# Patient Record
Sex: MALE | Race: WHITE | NOT HISPANIC OR LATINO | Employment: FULL TIME | URBAN - METROPOLITAN AREA
[De-identification: names, ages, dates, MRNs, and addresses within clinical notes are randomized per-mention and may not be internally consistent; named-entity substitution may affect disease eponyms.]

---

## 2017-02-03 ENCOUNTER — ALLSCRIPTS OFFICE VISIT (OUTPATIENT)
Dept: OTHER | Facility: OTHER | Age: 27
End: 2017-02-03

## 2017-02-21 ENCOUNTER — OFFICE VISIT (OUTPATIENT)
Dept: URGENT CARE | Facility: MEDICAL CENTER | Age: 27
End: 2017-02-21
Payer: COMMERCIAL

## 2017-02-21 DIAGNOSIS — R68.89 OTHER GENERAL SYMPTOMS AND SIGNS: ICD-10-CM

## 2017-02-21 PROCEDURE — 99213 OFFICE O/P EST LOW 20 MIN: CPT

## 2017-02-22 ENCOUNTER — APPOINTMENT (OUTPATIENT)
Dept: LAB | Facility: HOSPITAL | Age: 27
End: 2017-02-22
Payer: COMMERCIAL

## 2017-02-22 DIAGNOSIS — R68.89 OTHER GENERAL SYMPTOMS AND SIGNS: ICD-10-CM

## 2017-02-22 LAB
FLUAV AG SPEC QL IA: NEGATIVE
FLUAV AG SPEC QL: DETECTED
FLUBV AG SPEC QL IA: NEGATIVE
FLUBV AG SPEC QL: ABNORMAL
RSV B RNA SPEC QL NAA+PROBE: ABNORMAL

## 2017-02-22 PROCEDURE — 87798 DETECT AGENT NOS DNA AMP: CPT

## 2017-02-22 PROCEDURE — 87400 INFLUENZA A/B EACH AG IA: CPT

## 2017-05-08 ENCOUNTER — ALLSCRIPTS OFFICE VISIT (OUTPATIENT)
Dept: OTHER | Facility: OTHER | Age: 27
End: 2017-05-08

## 2017-06-19 ENCOUNTER — TRANSCRIBE ORDERS (OUTPATIENT)
Dept: SLEEP CENTER | Facility: CLINIC | Age: 27
End: 2017-06-19

## 2017-06-19 DIAGNOSIS — G47.33 OSA (OBSTRUCTIVE SLEEP APNEA): Primary | ICD-10-CM

## 2017-11-09 NOTE — PROGRESS NOTES
Assessment    1  Flu-like symptoms (780 99) (X77 24)    Plan   (1) RAPID INFLUENZA SCREEN with reflex to PCR; Status:Resulted - Requires Verification;   Done: [id number] 12:00AM [id number];RWUSOCE; Stat;   For:Flu-like symptoms; Ordered By:Guadagnino, Anastacio Koyanagi; Discussion/Summary  Discussion Summary:   Increased fluid intake  Tylenol motion as directed for fever  the bed,  Medication Side Effects Reviewed: Possible side effects of new medications were reviewed with the patient/guardian today  Understands and agrees with treatment plan: The treatment plan was reviewed with the patient/guardian  The patient/guardian understands and agrees with the treatment plan   Counseling Documentation With Imm: The patient was counseled regarding diagnostic results,-- instructions for management,-- risk factor reductions,-- prognosis,-- patient and family education,-- impressions,-- risks and benefits of treatment options,-- importance of compliance with treatment  Follow Up Instructions: Follow Up with your Primary Care Provider in 1-2 days  If your symptoms worsen, go to the nearest [facility] Emergency Department  Chief Complaint    1  Dizziness   2  Fever  Chief Complaint Free Text Note Form: Presents with body aches, fever, dizziness, nasal congestion starting yesterday  Extreme fatigue and malaise  Taking DaQuil and NyQuil for symptoms  Last taken this am       History of Present Illness  Hospital Based Practices Required Assessment:  Pain Assessment  the patient states they have pain  The pain is located in the General  The patient describes the pain as aching  (on a scale of 0 to 10, the patient rates the pain at 5 )  Abuse And Domestic Violence Screen   Domestic violence screen not done today  Reason DV Screen not done: Not alone   Depression And Suicide Screen  Suicide screen not done today  -- Reason suicide screen not done: Not alone  Prefered Language is  [place]  Primary Language is  English  Readiness To Learn: Receptive  Barriers To Learning: none  Preferred Learning: verbal   Influenza, Adult (Brief): The patient is being seen for an initial evaluation of influenza  Symptoms:  fever,-- chills,-- weakness,-- sore throat-- and-- cough, but-- no malaise  Associated symptoms:  dizziness, but-- no wheezing,-- no chest pain,-- no neck stiffness,-- no lightheadedness,-- no dry mouth,-- no decreased urination,-- no nausea,-- no vomiting-- and-- no diarrhea  Review of Systems  Focused-Male:  Constitutional: no fever or chills, feels well, no tiredness, no recent weight loss or weight gain  ENT: no complaints of earache, no loss of hearing, no nosebleeds or nasal discharge, no sore throat or hoarseness-- and-- as noted in HPI  Cardiovascular: no complaints of slow or fast heart rate, no chest pain, no palpitations, no leg claudication or lower extremity edema  Respiratory: no complaints of shortness of breath, no wheezing or cough, no dyspnea on exertion, no orthopnea or PND  Gastrointestinal: no complaints of abdominal pain, no constipation, no nausea or vomiting, no diarrhea or bloody stools  Genitourinary: no complaints of dysuria or incontinence, no hesitancy, no nocturia, no genital lesion, no inadequacy of penile erection  Musculoskeletal: no complaints of arthralgia, no myalgia, no joint swelling or stiffness, no limb pain or swelling  Integumentary: no complaints of skin rash or lesion, no itching or dry skin, no skin wounds  Neurological: no complaints of headache, no confusion, no numbness or tingling, no dizziness or fainting  Active Problems     1  Neck pain (723 1) (M54 2)   2  Pain in both wrists (719 43) (M25 531,M25 532)  Multiple sclerosis (340) (G35)       Past Medical History  1  History of migraine (V12 49) (Z86 69)  Active Problems And Past Medical History Reviewed: The active problems and past medical history were reviewed and updated today        Family History  Mother    1  Family history of hypothyroidism (V18 19) (Z83 49)  Father    2  Family history of kidney disease (V18 69) (Z84 1)   3  Family history of malignant neoplasm (V16 9) (Z80 9)  Maternal Grandfather    4  Family history of diabetes mellitus (V18 0) (Z83 3)  Family History Reviewed: The family history was reviewed and updated today  Social History     · Caffeine use (V49 89) (F15 90)   ·    · Never a smoker   · No drug use   · Social alcohol use (Z78 9)  Social History Reviewed: The social history was reviewed and updated today  Surgical History  Surgical History Reviewed: The surgical history was reviewed and updated today  Current Meds   1  Aubagio 14 MG Oral Tablet; take one tablet by mouth daily; Therapy: (Recorded:[id number]) to Recorded   2  Vitamin D 87915 UNIT CAPS; TAKE 1 CAPSULE WEEKLY; Therapy: (Recorded:21Ygv6675) to Recorded  Medication List Reviewed: The medication list was reviewed and updated today  Allergies    1  No Known Drug Allergies    Vitals  Signs   Recorded: 21Feb2017 07:38PM   Temperature: 99 7 F, Temporal  Heart Rate: 110  Pulse Quality: Normal  Respiration Quality: Normal  Respiration: 18  Systolic: 659, Sitting  Diastolic: 70, Sitting  O2 Saturation: 97, RA    Physical Exam   Constitutional  General appearance: No acute distress, well appearing and well nourished  Eyes  Conjunctiva and lids: No swelling, erythema, or discharge  Pupils and irises: Equal, round and reactive to light  Ears, Nose, Mouth, and Throat  External inspection of ears and nose: Normal    Otoscopic examination: Tympanic membrance translucent with normal light reflex  Canals patent without erythema  Nasal mucosa, septum, and turbinates: Abnormal  -- clear nasal mucosa discharge  Oropharynx: Abnormal  -- Mild erythema, +1 tonsils, she was spots  Pulmonary  Respiratory effort: No increased work of breathing or signs of respiratory distress     Auscultation of lungs: Clear to auscultation  Cardiovascular  Palpation of heart: Normal PMI, no thrills  Auscultation of heart: Normal rate and rhythm, normal S1 and S2, without murmurs  Examination of extremities for edema and/or varicosities: Normal    Abdomen  Abdomen: Non-tender, no masses  Liver and spleen: No hepatomegaly or splenomegaly  Lymphatic  Palpation of lymph nodes in neck: No lymphadenopathy  Musculoskeletal  Gait and station: Normal    Digits and nails: Normal without clubbing or cyanosis  Inspection/palpation of joints, bones, and muscles: Normal    Skin  Skin and subcutaneous tissue: Normal without rashes or lesions  -- cap refill less than 2 seconds  Neurologic  Cranial nerves: Cranial nerves 2-12 intact  Reflexes: 2+ and symmetric  Sensation: No sensory loss  Psychiatric  Orientation to person, place and time: Normal    Mood and affect: Normal        Message  Return to work or school:   Philip Sexton is under my professional care  He was seen in my office on 02/21/2017       Please excuse illness          Signatures   Electronically signed by : Cristy Williamson [facility]; Nov 7 2017 10:09PM EST                       (Author)    Electronically signed by : YUNG Funez ; Nov 8 2017  8:58AM EST                       (Co-author)

## 2018-01-13 VITALS
HEIGHT: 70 IN | WEIGHT: 125 LBS | SYSTOLIC BLOOD PRESSURE: 104 MMHG | BODY MASS INDEX: 17.9 KG/M2 | DIASTOLIC BLOOD PRESSURE: 62 MMHG | RESPIRATION RATE: 14 BRPM | HEART RATE: 86 BPM

## 2018-01-14 VITALS
RESPIRATION RATE: 12 BRPM | SYSTOLIC BLOOD PRESSURE: 108 MMHG | BODY MASS INDEX: 17.9 KG/M2 | OXYGEN SATURATION: 98 % | HEIGHT: 70 IN | DIASTOLIC BLOOD PRESSURE: 60 MMHG | WEIGHT: 125 LBS | HEART RATE: 68 BPM

## 2018-01-18 NOTE — MISCELLANEOUS
Message  Return to work or school:   Kendall Ma is under my professional care  He was seen in my office on 02/21/2017       Please excuse illness          Signatures   Electronically signed by : Eleazar Hernandez, [facility]; Nov 7 2017 10:09PM EST                       (Author)

## 2021-08-31 ENCOUNTER — TELEPHONE (OUTPATIENT)
Dept: NEUROLOGY | Facility: CLINIC | Age: 31
End: 2021-08-31

## 2021-08-31 NOTE — TELEPHONE ENCOUNTER
Best contact number for patient: [phone]    Emergency Contact name and number: [phone]    Referring provider and telephone number:    Primary Care Provider Name and if affiliated with [facility]:     Reason for Appointment/Dx: MS     Have you seen and followed up with a pediatric Neurologist for this disease in the past?      No (If yes ok to schedule with Dr Espinoza Grewal)    Neurology Location patient would like to be seen:    Order received? No                                                 Records Received? Yes    Have you ever seen another Neurologist?       Yes, Yes, Dr Liz Dawson Name:    ID/Policy #:    Secondary Insurance:    ID/Policy#: Workman's Comp/ Accident/ School  Information      Workman's Comp/Accident/School related?        No    If yes name of Insurance company:    Claim #:    Date of Injury:    Type of Injury:     Name and Telephone Number:    Notes:                   Appointment date: 01/28/2022

## 2022-01-27 ENCOUNTER — TELEPHONE (OUTPATIENT)
Dept: NEUROLOGY | Facility: CLINIC | Age: 32
End: 2022-01-27

## 2022-01-27 NOTE — TELEPHONE ENCOUNTER
Called to confirm and offer VV , spoke with patients  and he will set up patients My Chart for visit

## 2022-01-28 ENCOUNTER — TELEMEDICINE (OUTPATIENT)
Dept: NEUROLOGY | Facility: CLINIC | Age: 32
End: 2022-01-28
Payer: COMMERCIAL

## 2022-01-28 ENCOUNTER — TELEPHONE (OUTPATIENT)
Dept: NEUROLOGY | Facility: CLINIC | Age: 32
End: 2022-01-28

## 2022-01-28 DIAGNOSIS — R68.82 DECREASED LIBIDO: ICD-10-CM

## 2022-01-28 DIAGNOSIS — G35 MULTIPLE SCLEROSIS (HCC): Primary | ICD-10-CM

## 2022-01-28 PROCEDURE — 99204 OFFICE O/P NEW MOD 45 MIN: CPT | Performed by: PSYCHIATRY & NEUROLOGY

## 2022-01-28 RX ORDER — MULTIVIT-MIN/IRON FUM/FOLIC AC 7.5 MG-4
1 TABLET ORAL DAILY
COMMUNITY

## 2022-01-28 NOTE — PROGRESS NOTES
Virtual Regular Visit    Verification of patient location:    Patient is located in the following state in which I hold an active license [place]      Assessment/Plan:    Problem List Items Addressed This Visit        Nervous and Auditory    Multiple sclerosis ([address] ) - Primary     Pt here for neuro consultation  Pt wishes to re establish care for MS  Last seen in 2017  Pt notes he has been off imd meds since our last visit  Pt last on aubagio in dec 2018  Pt notes no recent infections  No recent hospitalizations  No recent steroids  No recent updated imaging  Exam stable  Last imaging from 2016  rec updated mri head and c spine  Updated labs including jcv testing  Referral to optho due to right eye pain and int blurriness of vision  Referral to urology due to decreased libidio  Follow up after studies to review best med options         Relevant Orders    MRI brain MS wo and w contrast    MRI cervical spine with and without contrast    Comprehensive metabolic panel    CBC and differential    Lyme Antibody Profile with reflex to WB    Sjogren's Antibodies    Vitamin D 25 hydroxy    Quantiferon TB Gold Plus    Ambulatory Referral to Urology    Ambulatory Referral to Ophthalmology       Other    Decreased libido     Not sure completely related to [address]  rec urology referral as well  No new bowel or bladder issues  Update imaging         Relevant Orders    Ambulatory Referral to Urology               Reason for visit is   Chief Complaint   Patient presents with    Virtual Regular Visit        Encounter provider Christos Miller MD    Provider located at [address] [place] [address] Drive  [address]  [phone]      Recent Visits  Date Type Provider Dept   01/27/22 Telephone Baron Kocher [facility] [place] [address] recent visits within past 7 days and meeting all other requirements  Today's Visits  Date Type Provider Dept   01/28/22 Telemedicine Kb Vincent Christie Caldera MD [facility] today's visits and meeting all other requirements  Future Appointments  No visits were found meeting these conditions  Showing future appointments within next 150 days and meeting all other requirements       The patient was identified by name and date of birth  Brookie Felty was informed that this is a telemedicine visit and that the visit is being conducted through [facility] and patient was informed this is a secure, HIPAA-complaint platform  He agrees to proceed     My office door was closed  No one else was in the room  He acknowledged consent and understanding of privacy and security of the video platform  The patient has agreed to participate and understands they can discontinue the visit at any time  Patient is aware this is a billable service  Subjective  Brookie Felty is a 32 y o  male with pmh of MS, JOSE who presents today to re establish MS care  Pt last seen on may 8 2017  Per last visit, "PMH of asthma, monitoring of thyroid disease by PCP, vertigo at age 16, who presents for MS follow up  He was initially seen in Sept 2016 for eval of newly diagnosed MS  Patient had symptoms starting in April 2016 with some issues with his speech  He notes he would stop in the middle of the sentence, or have slurring of his speech  PCP ordered MRI and labs  Patient notes first MRI was done without contrast, and he was brought back in for contrasted study  Patient notes he was told by PCP that he likely had MS and was sent to [address] [place] [address] neurology  He was seen in May 2016 by [facility] neurology and had additional studies including MRI c-spine and t-spine  He also had an EKG and was told this was possibly abnormal and referred to cardio   Cardiology would not schedule and appt until EKG was repeated because they thought there was artifact on the original test  No follow up EKG or cardio appt done as patient became frustrated that no one was ordering his studies or MS meds  Patient notes he was told he was going to start Tysabri, but no discussion of the med  When he researched the med on his own, he had concerns about taking the med as his first line therapy  He has never been treated with IV steroids to date  MRI brain 4/2016 no acute infarct  Multiple primarily sub centimeter FLAIR hyperintense lesions present within the subcortical WM, juxtacortical location and deep PV white matter  Additionally a single small lesion present within the splenium of the corpus callosum  At least one of the lesions has a somewhat Dawsonâs finger-like morphology  The totally number of lesions is approximately 15 with the largest at 12mm within the subcortical WM of the left parietal lobe  Although non-specific, given the number and location of the lesions, most concerning for demyelinating process  Patient went back with contrast and there were several areas of enhancement notes within the subcortical WM of the frontal lobes bilaterally, left parietal, and madeline temporal lobes  These are concerning for active demyelination  The total number of enhancing lesions is about 7 on this study  MRI t-spine done 6/3/16 no cord lesions  MRI c-spine 6/3/16 no clear evidence of focal cord demyelination, mild DDD, no spinal stenosis  Question of artifact on sagittal films  All studies reviewed with patient and discussed that lesions  by time and space would suggest MS  IMD options discussed at consult appointment and JCV testing ordered  Patient concerned about meds related to PML  rev results with pt including JCV negative with index of 0 17, done on Oct 17, 2016  Baseline CBC and hepatic function normal  Neg Lyme, Sjogrens  He and his spouse have researched some meds and are most interested in oral meds  He does not want Gilenya due to the abnormal EKG he had back in the Spring  pt has started [place] since our last visit  pt has been on med for 5 months  pt gloria med without difficulty  no rash  no gi issues  "  Pt here today after 5 years from last visit  Pt seen virtually today with his spouse  Overall pt notes he did not follow up due to timing and younger age  Pt notes no new sxs over the past 5 years  No recent infections  No recent hospitlizations  No fever or chills  No cough or sob  No cp  Pt does not think he had covid over the past 2 years but did have 2 days of fever in past    Pt notes no covid vaccination at this time  No urgi care visits  No recent iv steroids  Pt denies any new sxs  No falls or trips  No change in bowel or bladder  No LOC, no seizure  No change in speech or swallowing  occassional blurriness of vision  No loss of vision  No diplopia  No loss of vision  No headache, no nausea or vomiting  Pt notes no recent optho visits for many years  Pt notes issue with decreased libidio over the past 2 yrs  no changes in bowel or bladder  rec urology eval for any other causes as well  Pt has been off aubagio since dec 2018  Rev long half life of med  Pt weaned self off from daily to every other day  Pt felt med made him feel disconnected at times  No loc or sz  Rev imaging above with pt from 2016  Last imaging from April and June of 2016  Pt with active disease at that time and did not follow up  Pt notes summer heat can cause some exacerabation of sxs  Pt strongly recommended to update labs, imaging , jcv status and optho and urology consults  Pt in agreement  Pt to follow up to review all imd options  Pt had negative jcv in oct 2016 with titer of 0 17  Rev in detail fh, sh, ros, meds, allergies, pmh, psh with pt and spouse     currently pt only on mvi  HPI     History reviewed  No pertinent past medical history  History reviewed  No pertinent surgical history      Current Outpatient Medications   Medication Sig Dispense Refill    Multiple Vitamins-Minerals (multivitamin with minerals) tablet Take 1 tablet by mouth daily       No current facility-administered medications for this visit  No Known Allergies    Review of Systems   Constitutional: Negative  Negative for appetite change and fever  HENT: Negative  Negative for hearing loss, tinnitus, trouble swallowing and voice change  Eyes: Positive for pain and visual disturbance  Negative for photophobia  Sharp pain in back of right eye  When tired gets a lot of floaters   Respiratory: Negative  Negative for shortness of breath  Cardiovascular: Negative  Negative for palpitations  Gastrointestinal: Negative  Negative for nausea and vomiting  Endocrine: Negative  Negative for cold intolerance  Genitourinary: Negative  Negative for dysuria, frequency and urgency  Musculoskeletal: Positive for neck pain  Negative for myalgias  Skin: Negative  Negative for rash  Neurological: Positive for dizziness, light-headedness and numbness  Negative for tremors, seizures, syncope, facial asymmetry, speech difficulty, weakness and headaches  When get up in the morning feels dizzy  Gets numbness in tongue   Hematological: Negative  Does not bruise/bleed easily  Psychiatric/Behavioral: Negative  Negative for confusion, hallucinations and sleep disturbance  Memory and dexterity nonexistent  Right hand only  Has had in the past forgets topic of conversation   All other systems reviewed and are negative  Video Exam    There were no vitals filed for this visit  Physical Exam  Constitutional:       General: He is not in acute distress  Appearance: He is not ill-appearing  Eyes:      General: No visual field deficit  Musculoskeletal:      Right lower leg: No edema  Left lower leg: No edema  Neurological:      Mental Status: He is alert  Cranial Nerves: Cranial nerves are intact  No cranial nerve deficit, dysarthria or facial asymmetry  Motor: Motor function is intact   No weakness, tremor, atrophy, abnormal muscle tone, seizure activity or pronator drift  Coordination: Coordination is intact  Coordination normal  Finger-Nose-Finger Test normal  Rapid alternating movements normal       Gait: Gait is intact  I spent 45 minutes directly with the patient during this visit    VIRTUAL VISIT DISCLAIMER      Sara Stanton verbally agrees to participate in [employer]  Pt is aware that [employer] could be limited without vital signs or the ability to perform a full hands-on physical exam  Devante Caparimo understands he or the provider may request at any time to terminate the video visit and request the patient to seek care or treatment in person

## 2022-01-28 NOTE — TELEPHONE ENCOUNTER
Cara Javier, Pt seen virtually today  Pt needs mri head and c spine with and without contrast, labs and referral to urology and optho  Please mail out rxs to pt and help with scheduling  Needs follow up appt in 2 months  Yesica Peterson, please mail out jcv rx to pt    Please let pt know that the routine rxs can be done at any lab but the jcv lab must be done at [facility]

## 2022-01-28 NOTE — ASSESSMENT & PLAN NOTE
Pt here for neuro consultation  Pt wishes to re establish care for MS  Last seen in 2017  Pt notes he has been off imd meds since our last visit  Pt last on aubagio in dec 2018  Pt notes no recent infections  No recent hospitalizations  No recent steroids  No recent updated imaging  Exam stable  Last imaging from 2016  rec updated mri head and c spine  Updated labs including jcv testing  Referral to optho due to right eye pain and int blurriness of vision  Referral to urology due to decreased libidio  Follow up after studies to review best med options

## 2022-01-28 NOTE — ASSESSMENT & PLAN NOTE
Not sure completely related to MS  rec urology referral as well  No new bowel or bladder issues  Update imaging

## 2022-02-21 ENCOUNTER — TELEPHONE (OUTPATIENT)
Dept: NEUROLOGY | Facility: CLINIC | Age: 32
End: 2022-02-21

## 2022-02-21 NOTE — TELEPHONE ENCOUNTER
Spoke w/patient  Advised him of results and recommendations below  Patient verbalized understanding

## 2022-02-21 NOTE — TELEPHONE ENCOUNTER
----- Message from Roxanna Mcduffie PA-C sent at 2/21/2022 11:53 AM EST -----  (reviewing for Dr Marychuy Cervantes)  Please let patient know vit D is low    I am sending in Rx for vit D once a week x 12 weeks

## 2022-02-23 LAB
25(OH)D3 SERPL-MCNC: 16 NG/ML (ref 30–100)
ALBUMIN SERPL-MCNC: 4.9 G/DL (ref 3.6–5.1)
ALBUMIN/GLOB SERPL: 1.8 (CALC) (ref 1–2.5)
ALP SERPL-CCNC: 67 U/L (ref 36–130)
ALT SERPL-CCNC: 14 U/L (ref 9–46)
AST SERPL-CCNC: 14 U/L (ref 10–40)
B BURGDOR AB SER IA-ACNC: <0.9 INDEX
BASOPHILS # BLD AUTO: 20 CELLS/UL (ref 0–200)
BASOPHILS NFR BLD AUTO: 0.4 %
BILIRUB SERPL-MCNC: 0.9 MG/DL (ref 0.2–1.2)
BUN SERPL-MCNC: 20 MG/DL (ref 7–25)
BUN/CREAT SERPL: NORMAL (CALC) (ref 6–22)
CALCIUM SERPL-MCNC: 10.1 MG/DL (ref 8.6–10.3)
CHLORIDE SERPL-SCNC: 105 MMOL/L (ref 98–110)
CO2 SERPL-SCNC: 29 MMOL/L (ref 20–32)
CREAT SERPL-MCNC: 0.88 MG/DL (ref 0.6–1.35)
ENA SS-A AB SER IA-ACNC: NORMAL AI
ENA SS-B AB SER IA-ACNC: NORMAL AI
EOSINOPHIL # BLD AUTO: 80 CELLS/UL (ref 15–500)
EOSINOPHIL NFR BLD AUTO: 1.6 %
ERYTHROCYTE [DISTWIDTH] IN BLOOD BY AUTOMATED COUNT: 12 % (ref 11–15)
GLOBULIN SER CALC-MCNC: 2.7 G/DL (CALC) (ref 1.9–3.7)
GLUCOSE SERPL-MCNC: 89 MG/DL (ref 65–99)
HCT VFR BLD AUTO: 47.2 % (ref 38.5–50)
HGB BLD-MCNC: 15.9 G/DL (ref 13.2–17.1)
JCPYV AB SERPL QL IA: POSITIVE
LYMPHOCYTES # BLD AUTO: 1695 CELLS/UL (ref 850–3900)
LYMPHOCYTES NFR BLD AUTO: 33.9 %
M TB IFN-G CD4+ BCKGRND COR BLD-ACNC: 0 IU/ML
M TB IFN-G CD4+ BCKGRND COR BLD-ACNC: 0 IU/ML
M TB IFN-G CD4+ T-CELLS BLD-ACNC: 0.01 IU/ML
M TB TUBERC IFN-G BLD QL: NEGATIVE
M TB TUBERC IGNF/MITOGEN IGNF CONTROL: 8.66 IU/ML
MCH RBC QN AUTO: 30.4 PG (ref 27–33)
MCHC RBC AUTO-ENTMCNC: 33.7 G/DL (ref 32–36)
MCV RBC AUTO: 90.2 FL (ref 80–100)
MONOCYTES # BLD AUTO: 365 CELLS/UL (ref 200–950)
MONOCYTES NFR BLD AUTO: 7.3 %
NEUTROPHILS # BLD AUTO: 2840 CELLS/UL (ref 1500–7800)
NEUTROPHILS NFR BLD AUTO: 56.8 %
PLATELET # BLD AUTO: 258 THOUSAND/UL (ref 140–400)
PMV BLD REES-ECKER: 10 FL (ref 7.5–12.5)
POTASSIUM SERPL-SCNC: 4.2 MMOL/L (ref 3.5–5.3)
PROT SERPL-MCNC: 7.6 G/DL (ref 6.1–8.1)
RBC # BLD AUTO: 5.23 MILLION/UL (ref 4.2–5.8)
SL AMB EGFR AFRICAN AMERICAN: 133 ML/MIN/1.73M2
SL AMB EGFR NON AFRICAN AMERICAN: 114 ML/MIN/1.73M2
SL AMB INDEX VALUE: 0.87
SODIUM SERPL-SCNC: 141 MMOL/L (ref 135–146)
WBC # BLD AUTO: 5 THOUSAND/UL (ref 3.8–10.8)

## 2022-02-24 ENCOUNTER — HOSPITAL ENCOUNTER (OUTPATIENT)
Dept: RADIOLOGY | Facility: HOSPITAL | Age: 32
Discharge: HOME | End: 2022-02-24
Attending: PSYCHIATRY & NEUROLOGY
Payer: COMMERCIAL

## 2022-02-24 DIAGNOSIS — G35 MULTIPLE SCLEROSIS (HCC): ICD-10-CM

## 2022-02-24 PROCEDURE — 72156 MRI NECK SPINE W/O & W/DYE: CPT

## 2022-02-24 PROCEDURE — A9585 GADOBUTROL INJECTION: HCPCS | Performed by: PSYCHIATRY & NEUROLOGY

## 2022-02-24 PROCEDURE — 70553 MRI BRAIN STEM W/O & W/DYE: CPT

## 2022-02-24 PROCEDURE — G1004 CDSM NDSC: HCPCS

## 2022-02-24 RX ADMIN — GADOBUTROL 5 ML: 604.72 INJECTION INTRAVENOUS at 19:12

## 2022-02-24 RX ADMIN — GADOBUTROL 5 ML: 604.72 INJECTION INTRAVENOUS at 19:10

## 2022-02-28 ENCOUNTER — TELEPHONE (OUTPATIENT)
Dept: NEUROLOGY | Facility: CLINIC | Age: 32
End: 2022-02-28

## 2022-02-28 DIAGNOSIS — G35 MULTIPLE SCLEROSIS (HCC): Primary | ICD-10-CM

## 2022-02-28 RX ORDER — SODIUM CHLORIDE 9 MG/ML
20 INJECTION, SOLUTION INTRAVENOUS ONCE
Status: CANCELLED | OUTPATIENT
Start: 2022-03-02

## 2022-02-28 NOTE — TELEPHONE ENCOUNTER
Due to magnitude of new lesions and no prior OP infusion, recommend 2 options  Pt can go to er for steroid infusion to make sure he tolerates med ok  Other would be to do steroids in OP setting, but must be at an infusion center, rather than just at his home  Need to make sure he is ok with the steroids since I would like to do for at least 3 days  If no infection, and no acute sxs today, ok to try for infusion center as first choice  See if insurance covers  If not, he will need to go to er  If any concerns for recent covid exposure, pt needs to let us know before giving steroids    If able to go to infusion center, please send me order set for 3 day course and I will sign

## 2022-02-28 NOTE — TELEPHONE ENCOUNTER
Called [facility] and spoke with Neelam Chavez  Pt is scheduled for the following:    3/1/22 @ 8am  3/2/22 @ 9am  3/3/22 @ 2:30pm    Pt made aware, he reports he will need to adjust infusion appts due to work schedule  I provided [facility] phone #

## 2022-02-28 NOTE — TELEPHONE ENCOUNTER
Pt made aware  He would like to proceed with outpatient infusions  He denies any infection or acute symptoms today  Denies any recent COVID exposure  Called [facility] at [phone] and spoke with [facility]  Provided codes [address], [address], and [id number]  No PA is needed  Reference #[id number]  Therapy plan entered and sent for signature  Referral updated  TT sent

## 2022-02-28 NOTE — TELEPHONE ENCOUNTER
Spoke w/patient  He reports the following:    Fingers on right hand (fingers only) tingling; present about 7-8 months now  Weakness - everywhere on right side of body  Nothing dramatic  Predominantly in right arm/hand  Denies any bowel/bladder changes  Denies UTI symptoms  Admits to intermittent blurry vision  Notices it takes longer for eyes to focus after being closed  Difficulty driving at night (he rarely does so)  Denies any recent illness (cold in 12/2021)  Patient did not receive Covid-19 vaccines and he does not intend to  No new stressors  Denies drug use  Patient has not had IV steroids in past   No diabetes or psych hx  If getting steroids, patient would prefer  #1 Sunni Holliday  #2- -Amrit Hernandez - please advise

## 2022-02-28 NOTE — TELEPHONE ENCOUNTER
----- Message from Royal Yates MD sent at 2/28/2022 10:17 AM EST -----  Please let pt know that his mri head much worse compared to 2016  Pt last seen by us in 2017  Pt re estabilishing care  Please see if he is currently having any new sxs  Pt with 10 enhancing lesions in head mri  Please see if he ever had iv steroids in past   Any recent or current infection? Any fever or chills? Did he get covid vax? Awaiting mri c spine which was also just done but not read yet  If new or acute sxs, he will need to go to er  If no new sxs, I would still consider 3 days of iv steroids  See how he is doing  Did he ever do iv steroids at home or at an infusion center in past?    Royal Yates MD  P Neurology [facility] Clinical Team   Let pt know mri c spine worse since 2016   No acute pathology today on cord   See other task with acute new enh on mri head     Mri c spine concerning for multiple wm lesion in c cord and possibly upper t cord, new since prior exam   No spinal cord atrophy   Pt with progression of disease in cord as well as head    Pt needs to be back on imd meds  Pt has upcoming appt this month   If pt has any new or acute sxs, needs to go to er  Shayan Carballo task attached to mri head     Royal Yates MD  P Neurology [facility] Clinical Team 3  Also adding mri t spine to be done   Rx in emr   See full tasks on mri head

## 2022-03-01 ENCOUNTER — HOSPITAL ENCOUNTER (OUTPATIENT)
Dept: INFUSION CENTER | Facility: CLINIC | Age: 32
End: 2022-03-01

## 2022-03-02 ENCOUNTER — HOSPITAL ENCOUNTER (OUTPATIENT)
Dept: INFUSION CENTER | Facility: CLINIC | Age: 32
Discharge: HOME | End: 2022-03-02
Payer: COMMERCIAL

## 2022-03-02 ENCOUNTER — HOSPITAL ENCOUNTER (OUTPATIENT)
Dept: INFUSION CENTER | Facility: CLINIC | Age: 32
End: 2022-03-02

## 2022-03-02 VITALS
RESPIRATION RATE: 18 BRPM | TEMPERATURE: 97.2 F | SYSTOLIC BLOOD PRESSURE: 122 MMHG | HEART RATE: 80 BPM | DIASTOLIC BLOOD PRESSURE: 76 MMHG

## 2022-03-02 DIAGNOSIS — G35 MULTIPLE SCLEROSIS (HCC): Primary | ICD-10-CM

## 2022-03-02 PROCEDURE — 96365 THER/PROPH/DIAG IV INF INIT: CPT

## 2022-03-02 RX ORDER — SODIUM CHLORIDE 9 MG/ML
20 INJECTION, SOLUTION INTRAVENOUS ONCE
Status: CANCELLED | OUTPATIENT
Start: 2022-03-03

## 2022-03-02 RX ORDER — SODIUM CHLORIDE 9 MG/ML
20 INJECTION, SOLUTION INTRAVENOUS ONCE
Status: COMPLETED | OUTPATIENT
Start: 2022-03-02 | End: 2022-03-02

## 2022-03-02 RX ADMIN — SODIUM CHLORIDE 1000 MG: 9 INJECTION, SOLUTION INTRAVENOUS at 14:58

## 2022-03-02 RX ADMIN — SODIUM CHLORIDE 20 ML/HR: 0.9 INJECTION, SOLUTION INTRAVENOUS at 14:57

## 2022-03-02 NOTE — PLAN OF CARE
Problem: Potential for Falls  Goal: Patient will remain free of falls  Description: INTERVENTIONS:  - Educate patient/family on patient safety including physical limitations  - Instruct patient to call for assistance with activity   - Consult OT/PT to assist with strengthening/mobility   - Keep Call bell within reach  - Keep bed low and locked with side rails adjusted as appropriate  - Keep care items and personal belongings within reach  - Initiate and maintain comfort rounds  - Make Fall Risk Sign visible to staff  - Of- Apply yellow socks and bracelet for high fall risk patients  - Consider moving patient to room near nurses station  Outcome: Progressing

## 2022-03-02 NOTE — PROGRESS NOTES
Pt arrived to unit without complaint, tolerated SoluMedrol infusion without adverse reaction  Pt left unit in stable condition, AVS provided with future appts

## 2022-03-03 ENCOUNTER — HOSPITAL ENCOUNTER (OUTPATIENT)
Dept: INFUSION CENTER | Facility: CLINIC | Age: 32
Discharge: HOME | End: 2022-03-03
Payer: COMMERCIAL

## 2022-03-03 VITALS
OXYGEN SATURATION: 98 % | SYSTOLIC BLOOD PRESSURE: 115 MMHG | RESPIRATION RATE: 18 BRPM | DIASTOLIC BLOOD PRESSURE: 78 MMHG | TEMPERATURE: 97.7 F | HEART RATE: 110 BPM

## 2022-03-03 DIAGNOSIS — G35 MULTIPLE SCLEROSIS (HCC): Primary | ICD-10-CM

## 2022-03-03 PROCEDURE — 96365 THER/PROPH/DIAG IV INF INIT: CPT

## 2022-03-03 RX ORDER — SODIUM CHLORIDE 9 MG/ML
20 INJECTION, SOLUTION INTRAVENOUS ONCE
Status: COMPLETED | OUTPATIENT
Start: 2022-03-03 | End: 2022-03-03

## 2022-03-03 RX ORDER — SODIUM CHLORIDE 9 MG/ML
20 INJECTION, SOLUTION INTRAVENOUS ONCE
Status: CANCELLED | OUTPATIENT
Start: 2022-03-04

## 2022-03-03 RX ADMIN — SODIUM CHLORIDE 20 ML/HR: 9 INJECTION, SOLUTION INTRAVENOUS at 14:40

## 2022-03-03 RX ADMIN — SODIUM CHLORIDE 1000 MG: 9 INJECTION, SOLUTION INTRAVENOUS at 15:01

## 2022-03-03 NOTE — PROGRESS NOTES
Pt here for Solumedrol infusion  IV started with no issue and infusing with L[facility] @ [facility]  Vital signs stable  Pt resting comfortably and has no further questions or concerns  Call bell with in reach

## 2022-03-04 ENCOUNTER — TELEPHONE (OUTPATIENT)
Dept: NEUROLOGY | Facility: CLINIC | Age: 32
End: 2022-03-04

## 2022-03-04 ENCOUNTER — HOSPITAL ENCOUNTER (OUTPATIENT)
Dept: INFUSION CENTER | Facility: CLINIC | Age: 32
Discharge: HOME | End: 2022-03-04
Payer: COMMERCIAL

## 2022-03-04 VITALS
DIASTOLIC BLOOD PRESSURE: 78 MMHG | TEMPERATURE: 97.8 F | SYSTOLIC BLOOD PRESSURE: 109 MMHG | HEART RATE: 91 BPM | RESPIRATION RATE: 18 BRPM

## 2022-03-04 DIAGNOSIS — G35 MULTIPLE SCLEROSIS (HCC): Primary | ICD-10-CM

## 2022-03-04 PROCEDURE — 96365 THER/PROPH/DIAG IV INF INIT: CPT

## 2022-03-04 RX ORDER — SODIUM CHLORIDE 9 MG/ML
20 INJECTION, SOLUTION INTRAVENOUS ONCE
Status: CANCELLED | OUTPATIENT
Start: 2022-03-04

## 2022-03-04 RX ORDER — SODIUM CHLORIDE 9 MG/ML
20 INJECTION, SOLUTION INTRAVENOUS ONCE
Status: COMPLETED | OUTPATIENT
Start: 2022-03-04 | End: 2022-03-04

## 2022-03-04 RX ADMIN — SODIUM CHLORIDE 20 ML/HR: 9 INJECTION, SOLUTION INTRAVENOUS at 14:59

## 2022-03-04 RX ADMIN — SODIUM CHLORIDE 1000 MG: 9 INJECTION, SOLUTION INTRAVENOUS at 15:18

## 2022-03-04 NOTE — TELEPHONE ENCOUNTER
----- Message from Axel Gudino RN sent at 3/4/2022  8:30 AM EST -----  Regarding: FW: Steroid infusion    ----- Message -----  From: Rebecca Hayden  Sent: 3/4/2022   3:48 AM EST  To: Neurology [facility] Clinical Team 3  Subject: Steroid infusion                                 Good morning Esteban Soto sorry for the time, I had just woken up  I was wondering about how much time does it normally take for the steroid infusions to have an effect? I ask because Im just now noticing the the slight numbness in my right fingers that Ive gotten so used to I honestly didnt notice it too much anymore, is almost gone, and it feels normal  Im not sure if its my brain playing an optometrist trick on me, or if it could be helping to heal this soon after only 2 infusions thus far? Sorry to bother  I am just curious for a true answer,  because the internet has 96 answers for one question val     Thank you    ~Onofre LU

## 2022-03-04 NOTE — PROGRESS NOTES
Patient arrived to unit without complaint  Patient tolerated Solumedrol infusion without incident  AVS declined and patient does not have future infusion appointments at this time  Patient left in stable condition

## 2022-03-04 NOTE — TELEPHONE ENCOUNTER
Let pt know it can take days to weeks to see if steroids help with any of his newer sxs  Likely not helpful for older or more chronic sxs  Have pt keep us updated on how he is doing    Pt should be following up with us in next month

## 2022-03-07 ENCOUNTER — TELEPHONE (OUTPATIENT)
Dept: NEUROLOGY | Facility: CLINIC | Age: 32
End: 2022-03-07

## 2022-03-07 NOTE — TELEPHONE ENCOUNTER
Vivian Villalba  to Marivel Patel MD    [facility]      3/4/22 8:51 AM  Second question to tack on  Dr Zacarias Vang is sending me for blood work for my testosterone level  Can you tell me if these infusions will mess the blood test? Should I wait to get that done?

## 2022-03-07 NOTE — TELEPHONE ENCOUNTER
Spoke w/patient's spouse Maria C Flores (on consent form)  Advised him of results and recommendations below

## 2022-03-07 NOTE — TELEPHONE ENCOUNTER
Dr Maggie Peña - advised patient to contact PCP (who ordered additional labs) regarding his additional question (see below)

## 2022-03-25 ENCOUNTER — OFFICE VISIT (OUTPATIENT)
Dept: NEUROLOGY | Facility: CLINIC | Age: 32
End: 2022-03-25
Payer: COMMERCIAL

## 2022-03-25 VITALS
WEIGHT: 140.8 LBS | SYSTOLIC BLOOD PRESSURE: 116 MMHG | DIASTOLIC BLOOD PRESSURE: 65 MMHG | RESPIRATION RATE: 18 BRPM | BODY MASS INDEX: 20.16 KG/M2 | TEMPERATURE: 97 F | HEIGHT: 70 IN | HEART RATE: 73 BPM

## 2022-03-25 DIAGNOSIS — E55.9 VITAMIN D DEFICIENCY: ICD-10-CM

## 2022-03-25 DIAGNOSIS — G35 MULTIPLE SCLEROSIS (HCC): Primary | ICD-10-CM

## 2022-03-25 PROCEDURE — 99214 OFFICE O/P EST MOD 30 MIN: CPT | Performed by: PHYSICIAN ASSISTANT

## 2022-03-25 NOTE — PATIENT INSTRUCTIONS
Will submit start form for Aubagio  Once you are on the med for a month, start going for your monthly blood work  Standing order is in your chart if you use a [facility] lab  Continue Rx vit D    Once that is finished, start over the counter vitamin D3 4,000IU daily  Follow up in 3-4 months  Call for any new or worsening symptoms

## 2022-03-29 NOTE — ASSESSMENT & PLAN NOTE
32year old male with MS diagnosed in 2016, started on Aubagio at time of diagnosis, and then was lost to follow up  He stopped Aubagio on his own in Dec 2018  He had a prolonged absence from our office between May 2017 and January 2022 when he presented to re-establish care  Imaging was recently obtained which showed significant disease progression compared to last imaging in 2016  He had at least 10 new, enhancing lesions in the brain and multiple new cervical cord lesions  MRI t-spine is pending, scheduled  He received 3 days of IV steroids 3/2-3/4, tolerated well  He is ready to resume DMT, which we discussed is absolutely advised due to active disease on recent MRIs  He overall tolerated Aubagio well while on it, just noted some fatigue, but unsure if related to the medication, because he still has that despite being off med for several years  He was previously JCV neg, now JCV positive  We discussed he would not be an ideal candidate for Tysabri  Besides Aubagio, options discussed included Tecfidera, Vumerity, B-cell depleting therapies such as Ocrevus or Birdie Silversmith  He is concerned about immunosuppression with B-cell depleting therapies and would like to avoid for now  He would rather take a once daily med such as Aubagio vs BID dosing with Tecfidera and Vumerity  Reviewed side effects of Aubagio  He has no plans for fathering children at this time, explained teratogenicity potential with Aubagio  He is aware of long half life of the med  He is aware he will need blood work done monthly for the first 6 months on drug  Start form signed for Aubagio today  Standing order for monthly CBC and hepatic function panel entered  He will return in 3-4 months or sooner if needed  He was advised to call for any new or worsening symptoms in the meantime

## 2022-04-02 ENCOUNTER — HOSPITAL ENCOUNTER (OUTPATIENT)
Dept: RADIOLOGY | Facility: HOSPITAL | Age: 32
Discharge: HOME | End: 2022-04-02
Attending: PSYCHIATRY & NEUROLOGY
Payer: COMMERCIAL

## 2022-04-02 DIAGNOSIS — G35 MULTIPLE SCLEROSIS (HCC): ICD-10-CM

## 2022-04-02 PROCEDURE — A9585 GADOBUTROL INJECTION: HCPCS | Performed by: PSYCHIATRY & NEUROLOGY

## 2022-04-02 PROCEDURE — G1004 CDSM NDSC: HCPCS

## 2022-04-02 PROCEDURE — 72157 MRI CHEST SPINE W/O & W/DYE: CPT

## 2022-04-02 RX ADMIN — GADOBUTROL 6 ML: 604.72 INJECTION INTRAVENOUS at 13:14

## 2022-04-06 ENCOUNTER — TELEPHONE (OUTPATIENT)
Dept: NEUROLOGY | Facility: CLINIC | Age: 32
End: 2022-04-06

## 2022-04-06 DIAGNOSIS — G35 MULTIPLE SCLEROSIS (HCC): Primary | ICD-10-CM

## 2022-04-06 NOTE — TELEPHONE ENCOUNTER
----- Message from Patricio Summers PA-C sent at 4/5/2022  3:13 PM EDT -----  Can let patient know MRI t-spine with 3 lesions noted, all in the lower thoracic/upper lumbar area of the cord  No enhancement, meaning none of these are brand new  We are working on getting him back on DMT  No change in management at this time  This is essentially just a new baseline study

## 2022-04-08 NOTE — TELEPHONE ENCOUNTER
Spoke w/patient  Advised him of below  Patient verbalized understanding  Patient states he has not heard from [facility]  Asking for updated status  Called [facility]  Spoke w/Marcelino  They did receive start form on 3/3/1/22  In process of BI  This should be done by the end of today  She will maría account as "STAT "  Nurse will reach out to patient once BI is complete  Patient made aware

## 2022-04-08 NOTE — TELEPHONE ENCOUNTER
Patient's spouse Kim called  States he did receive a call from [facility]  They advised Nolberto will require a PA  Will work on [place]

## 2022-04-13 NOTE — TELEPHONE ENCOUNTER
Received call from Gina Dennis with [facility]  She reports they received Rx for Aubagio 14mg  Pt's insurance requires medication to be filled by [facility]  Awaiting PA determination  If approved, will need to have script sent to [facility] pharmacy

## 2022-04-14 RX ORDER — RENAGEL 800 MG/1
14 TABLET ORAL DAILY
Qty: 30 TABLET | Refills: 11 | Status: SHIPPED | OUTPATIENT
Start: 2022-04-14

## 2022-04-14 NOTE — TELEPHONE ENCOUNTER
PA Gonzalez# [id number] for Hal Carrillo has been approved from 2/13/22 - 4/12/24  New script pended for [facility] pharmacy (see below)  Patient made aware of approval and change of pharmacy  Gómez Perry - please sign if agreeable  Jessica Brice

## 2022-04-15 NOTE — TELEPHONE ENCOUNTER
Called Lisa Gómez w/[employer]  She states script is in benefits verification status  Once completed, patient will be contacted to arrange overnight shipment of medication

## 2022-05-14 ENCOUNTER — PATIENT MESSAGE (OUTPATIENT)
Dept: NEUROLOGY | Facility: CLINIC | Age: 32
End: 2022-05-14

## 2022-05-14 ENCOUNTER — APPOINTMENT (OUTPATIENT)
Dept: LAB | Facility: MEDICAL CENTER | Age: 32
End: 2022-05-14
Payer: COMMERCIAL

## 2022-05-14 DIAGNOSIS — E29.1 3-OXO-5 ALPHA-STEROID DELTA 4-DEHYDROGENASE DEFICIENCY: ICD-10-CM

## 2022-05-14 DIAGNOSIS — G35 MULTIPLE SCLEROSIS (HCC): ICD-10-CM

## 2022-05-14 LAB
ALBUMIN SERPL BCP-MCNC: 4.3 G/DL (ref 3.5–5)
ALP SERPL-CCNC: 69 U/L (ref 46–116)
ALT SERPL W P-5'-P-CCNC: 26 U/L (ref 12–78)
AST SERPL W P-5'-P-CCNC: 16 U/L (ref 5–45)
BASOPHILS # BLD AUTO: 0.03 THOUSANDS/ΜL (ref 0–0.1)
BASOPHILS NFR BLD AUTO: 1 % (ref 0–1)
BILIRUB DIRECT SERPL-MCNC: 0.22 MG/DL (ref 0–0.2)
BILIRUB SERPL-MCNC: 1.1 MG/DL (ref 0.2–1)
EOSINOPHIL # BLD AUTO: 0.08 THOUSAND/ΜL (ref 0–0.61)
EOSINOPHIL NFR BLD AUTO: 2 % (ref 0–6)
ERYTHROCYTE [DISTWIDTH] IN BLOOD BY AUTOMATED COUNT: 11.9 % (ref 11.6–15.1)
FSH SERPL-ACNC: 8.3 MIU/ML (ref 0.7–10.8)
HCT VFR BLD AUTO: 46.6 % (ref 36.5–49.3)
HGB BLD-MCNC: 16 G/DL (ref 12–17)
IMM GRANULOCYTES # BLD AUTO: 0 THOUSAND/UL (ref 0–0.2)
IMM GRANULOCYTES NFR BLD AUTO: 0 % (ref 0–2)
LYMPHOCYTES # BLD AUTO: 1.64 THOUSANDS/ΜL (ref 0.6–4.47)
LYMPHOCYTES NFR BLD AUTO: 35 % (ref 14–44)
MCH RBC QN AUTO: 30.8 PG (ref 26.8–34.3)
MCHC RBC AUTO-ENTMCNC: 34.3 G/DL (ref 31.4–37.4)
MCV RBC AUTO: 90 FL (ref 82–98)
MONOCYTES # BLD AUTO: 0.43 THOUSAND/ΜL (ref 0.17–1.22)
MONOCYTES NFR BLD AUTO: 9 % (ref 4–12)
NEUTROPHILS # BLD AUTO: 2.52 THOUSANDS/ΜL (ref 1.85–7.62)
NEUTS SEG NFR BLD AUTO: 53 % (ref 43–75)
NRBC BLD AUTO-RTO: 0 /100 WBCS
PLATELET # BLD AUTO: 247 THOUSANDS/UL (ref 149–390)
PMV BLD AUTO: 10.5 FL (ref 8.9–12.7)
PROT SERPL-MCNC: 7.5 G/DL (ref 6.4–8.2)
RBC # BLD AUTO: 5.2 MILLION/UL (ref 3.88–5.62)
TESTOST SERPL-MCNC: 393 NG/DL (ref 113–1065)
WBC # BLD AUTO: 4.7 THOUSAND/UL (ref 4.31–10.16)

## 2022-05-14 PROCEDURE — 85025 COMPLETE CBC W/AUTO DIFF WBC: CPT | Performed by: PHYSICIAN ASSISTANT

## 2022-05-14 PROCEDURE — 36415 COLL VENOUS BLD VENIPUNCTURE: CPT | Performed by: PHYSICIAN ASSISTANT

## 2022-05-14 PROCEDURE — 83001 ASSAY OF GONADOTROPIN (FSH): CPT

## 2022-05-14 PROCEDURE — 80076 HEPATIC FUNCTION PANEL: CPT | Performed by: PHYSICIAN ASSISTANT

## 2022-05-14 PROCEDURE — 84403 ASSAY OF TOTAL TESTOSTERONE: CPT

## 2022-05-16 ENCOUNTER — TELEPHONE (OUTPATIENT)
Dept: NEUROLOGY | Facility: CLINIC | Age: 32
End: 2022-05-16

## 2022-05-16 NOTE — TELEPHONE ENCOUNTER
----- Message from Francisco Stark PA-C sent at 5/16/2022 11:32 AM EDT -----  Please let patient know bilirubin is up slightly, but AST and ALT are normal   He is newly on Aubagio  Should continue monthly labs with standing order    No concern at this time

## 2022-05-16 NOTE — TELEPHONE ENCOUNTER
Spoke w/patient  Advised him of results and recommendations below  Patient verbalized understanding to all

## 2022-06-18 ENCOUNTER — APPOINTMENT (OUTPATIENT)
Dept: LAB | Facility: MEDICAL CENTER | Age: 32
End: 2022-06-18
Payer: COMMERCIAL

## 2022-06-18 LAB
25(OH)D3 SERPL-MCNC: 98.4 NG/ML (ref 30–100)
ALBUMIN SERPL BCP-MCNC: 4.3 G/DL (ref 3.5–5)
ALP SERPL-CCNC: 74 U/L (ref 46–116)
ALT SERPL W P-5'-P-CCNC: 31 U/L (ref 12–78)
ANION GAP SERPL CALCULATED.3IONS-SCNC: 2 MMOL/L (ref 4–13)
AST SERPL W P-5'-P-CCNC: 16 U/L (ref 5–45)
BASOPHILS # BLD AUTO: 0.03 THOUSANDS/ΜL (ref 0–0.1)
BASOPHILS NFR BLD AUTO: 1 % (ref 0–1)
BILIRUB SERPL-MCNC: 0.76 MG/DL (ref 0.2–1)
BUN SERPL-MCNC: 18 MG/DL (ref 5–25)
CALCIUM SERPL-MCNC: 9.6 MG/DL (ref 8.3–10.1)
CHLORIDE SERPL-SCNC: 107 MMOL/L (ref 100–108)
CO2 SERPL-SCNC: 31 MMOL/L (ref 21–32)
CREAT SERPL-MCNC: 0.94 MG/DL (ref 0.6–1.3)
EOSINOPHIL # BLD AUTO: 0.08 THOUSAND/ΜL (ref 0–0.61)
EOSINOPHIL NFR BLD AUTO: 2 % (ref 0–6)
ERYTHROCYTE [DISTWIDTH] IN BLOOD BY AUTOMATED COUNT: 11.9 % (ref 11.6–15.1)
GFR SERPL CREATININE-BSD FRML MDRD: 106 ML/MIN/1.73SQ M
GLUCOSE P FAST SERPL-MCNC: 85 MG/DL (ref 65–99)
HCT VFR BLD AUTO: 48.5 % (ref 36.5–49.3)
HGB BLD-MCNC: 16.2 G/DL (ref 12–17)
IMM GRANULOCYTES # BLD AUTO: 0.01 THOUSAND/UL (ref 0–0.2)
IMM GRANULOCYTES NFR BLD AUTO: 0 % (ref 0–2)
LYMPHOCYTES # BLD AUTO: 1.78 THOUSANDS/ΜL (ref 0.6–4.47)
LYMPHOCYTES NFR BLD AUTO: 39 % (ref 14–44)
MCH RBC QN AUTO: 30.2 PG (ref 26.8–34.3)
MCHC RBC AUTO-ENTMCNC: 33.4 G/DL (ref 31.4–37.4)
MCV RBC AUTO: 90 FL (ref 82–98)
MONOCYTES # BLD AUTO: 0.44 THOUSAND/ΜL (ref 0.17–1.22)
MONOCYTES NFR BLD AUTO: 10 % (ref 4–12)
NEUTROPHILS # BLD AUTO: 2.19 THOUSANDS/ΜL (ref 1.85–7.62)
NEUTS SEG NFR BLD AUTO: 48 % (ref 43–75)
NRBC BLD AUTO-RTO: 0 /100 WBCS
PLATELET # BLD AUTO: 250 THOUSANDS/UL (ref 149–390)
PMV BLD AUTO: 10.3 FL (ref 8.9–12.7)
POTASSIUM SERPL-SCNC: 3.9 MMOL/L (ref 3.5–5.3)
PROT SERPL-MCNC: 7.8 G/DL (ref 6.4–8.2)
RBC # BLD AUTO: 5.37 MILLION/UL (ref 3.88–5.62)
SODIUM SERPL-SCNC: 140 MMOL/L (ref 136–145)
WBC # BLD AUTO: 4.53 THOUSAND/UL (ref 4.31–10.16)

## 2022-06-18 PROCEDURE — 85025 COMPLETE CBC W/AUTO DIFF WBC: CPT

## 2022-06-18 PROCEDURE — 86480 TB TEST CELL IMMUN MEASURE: CPT

## 2022-06-18 PROCEDURE — 80053 COMPREHEN METABOLIC PANEL: CPT

## 2022-06-18 PROCEDURE — 82306 VITAMIN D 25 HYDROXY: CPT

## 2022-06-18 PROCEDURE — 86235 NUCLEAR ANTIGEN ANTIBODY: CPT

## 2022-06-18 PROCEDURE — 86618 LYME DISEASE ANTIBODY: CPT

## 2022-06-20 LAB
B BURGDOR IGG+IGM SER-ACNC: <0.2 AI
GAMMA INTERFERON BACKGROUND BLD IA-ACNC: 0.02 IU/ML
M TB IFN-G BLD-IMP: NEGATIVE
M TB IFN-G CD4+ BCKGRND COR BLD-ACNC: -0.01 IU/ML
M TB IFN-G CD4+ BCKGRND COR BLD-ACNC: 0 IU/ML
MITOGEN IGNF BCKGRD COR BLD-ACNC: >10 IU/ML

## 2022-06-23 ENCOUNTER — TELEPHONE (OUTPATIENT)
Dept: NEUROLOGY | Facility: CLINIC | Age: 32
End: 2022-06-23

## 2022-06-23 NOTE — TELEPHONE ENCOUNTER
LMOM to remind pt of upcoming appt on 07/05/22  Left the office number for any questions or concerns

## 2022-07-05 ENCOUNTER — OFFICE VISIT (OUTPATIENT)
Dept: NEUROLOGY | Facility: CLINIC | Age: 32
End: 2022-07-05
Payer: COMMERCIAL

## 2022-07-05 VITALS
BODY MASS INDEX: 19.67 KG/M2 | HEART RATE: 84 BPM | WEIGHT: 137.4 LBS | RESPIRATION RATE: 18 BRPM | SYSTOLIC BLOOD PRESSURE: 121 MMHG | DIASTOLIC BLOOD PRESSURE: 69 MMHG | HEIGHT: 70 IN | TEMPERATURE: 97.2 F

## 2022-07-05 DIAGNOSIS — R41.3 COMPLAINTS OF MEMORY DISTURBANCE: ICD-10-CM

## 2022-07-05 DIAGNOSIS — G35 MULTIPLE SCLEROSIS (HCC): Primary | ICD-10-CM

## 2022-07-05 DIAGNOSIS — E55.9 VITAMIN D DEFICIENCY: ICD-10-CM

## 2022-07-05 PROCEDURE — 99214 OFFICE O/P EST MOD 30 MIN: CPT | Performed by: PHYSICIAN ASSISTANT

## 2022-07-05 RX ORDER — MELATONIN
1000 DAILY
COMMUNITY

## 2022-07-05 NOTE — PATIENT INSTRUCTIONS
Continue Aubagio  Continue getting labs done every month with standing order  Suggest ophthalmology eval--referral was placed to Dr Darrell Spears at [facility]  Referral to neuropsychology for cognitive assessment  Follow up in November with Dr Tony Wilkins or sooner if needed    Will update MRI brain prior to that visit

## 2022-07-05 NOTE — PROGRESS NOTES
Patient ID: Marcio Miranda is a 28 y o  male  Assessment/Plan:    Multiple sclerosis ([facility] [address] )  28year old male with MS diagnosed in 2016, started on Aubagio at time of diagnosis, and then was lost to follow up  He stopped Aubagio on his own in Dec 2018  He had a prolonged absence from our office between May 2017 and January 2022 when he presented to re-establish care  Imaging was recently obtained which showed significant disease progression compared to last imaging in 2016  He had at least 10 new, enhancing lesions in the brain and multiple new cervical cord lesions  He received 3 days of IV steroids 3/2-3/4/22, tolerated well  At time of visit 3/25/22 we discussed resuming DMT, and he chose to re-trial Aubagio, as he never failed this medication  He started Aubagio in mid-late April 2022 and is tolerating well  No reported side effects  Labs are being done regularly, most recently 6/18/22  Normal LFTs and CBC  He will continue getting labs done monthly with standing order for 6 months after starting Aubagio  Vitamin D deficiency  Completed course of Rx vit D, now maintaining on OTC and level is high end of normal   Will monitor  Complaints of memory disturbance  Patient with complaints of memory disturbance, both short and long term  Examples are rather odd, such as he cannot tell me anything he did on Saturday but "remembers" the day  He also gives some examples of attentive/focus issues  He has absolutely no difficulty functioning day to day, no issues at work, driving, with finances etc     We discussed neuropsychological eval, as I cannot explain the issues he is describing  He will return in 4 months or sooner if needed  Diagnoses and all orders for this visit:    Multiple sclerosis [facility])  -     Ambulatory Referral to Ophthalmology; Future  -     Ambulatory referral to Neuropsychology;  Future  -     MRI brain MS wo and w contrast; Future    Complaints of memory disturbance  -     Ambulatory referral to Neuropsychology; Future    Vitamin D deficiency    Other orders  -     cholecalciferol (VITAMIN D3) 1,000 units tablet; Take 1,000 Units by mouth daily           Subjective:    HPI    Elena Grimm is a 28 y o  male with PMH of MS, JOSE who presents today for follow up  Patient was previously followed by our office for his MS, but had a prolonged absence from our office between May 2017 and January 2022 when he presented to re-establish care  To review, he was initially seen in Sept 2016 for eval of newly diagnosed MS  Patient had symptoms starting in April 2016 with some issues with his speech  He notes he would stop in the middle of the sentence, or have slurring of his speech  PCP ordered MRI and labs  Patient notes first MRI was done without contrast, and he was brought back in for contrasted study  Patient notes he was told by PCP that he likely had MS and was sent to [facility] neurology  He was seen in May 2016 by [facility] neurology and had additional studies including MRI c-spine and t-spine  Patient notes he was told he was going to start Tysabri, but no discussion of the med  When he researched the med on his own, he had concerns about taking the med as his first line therapy  MRI brain 4/2016 no acute infarct  Multiple primarily sub centimeter FLAIR hyperintense lesions present within the subcortical WM, juxtacortical location and deep PV white matter  Additionally a single small lesion present within the splenium of the corpus callosum  At least one of the lesions has a somewhat Roberts finger-like morphology  The totally number of lesions is approximately 15 with the largest at 12mm within the subcortical WM of the left parietal lobe  Although non-specific, given the number and location of the lesions, most concerning for demyelinating process   Patient went back with contrast and there were several areas of enhancement notes within the subcortical WM of the frontal lobes bilaterally, left parietal, and madeline temporal lobes  MRI t-spine done 6/3/16 no cord lesions  MRI c-spine 6/3/16 no clear evidence of focal cord demyelination, mild DDD, no spinal stenosis  Question of artifact on sagittal films  JCV negative with index of 0 17, done on Oct 17, 2016  Patient started Normie Heredia as his first medication in late 2016  Patient returned to our office after 5 years absence  He reported he stopped Aubagio in December 2018  He felt he was doing well and did not feel he needed medication or follow up  He reported in the months leading up to returning to our office, he did notice some new symptoms including right-sided weakness, right hand tingling, occasional blurry vision  Following his visit to re-establish care, he had updated imaging completed  MRI brain 2/24/2022 compared to 4/2016  Significant disease progression noted including at least 10 enhancing supratentorial lesions  MRI c-spine 2/24/2022 compared to 6/2016  Multiple white matter lesions scattered throughout cervical spinal cord and possibly upper thoracic cord, new since prior exam   No enhancing cord lesions  Patient was called with these results and IV steroids were offered  He completed 3 days of IV steroids 3/2, 3/3 and 3/4, overall tolerated well  Labs completed 2/19/22  Vit D low at 16  Neg Lyme, Sjogren's, Quantiferon Gold TB  JCV now positive with index 0 87  Normal CBC and CMP  Patient was seen on 3/25/22 and we discussed re-initiating DMT due to evidence of disease progression on imaging  He wanted to re-trial Aubagio, and start form was signed  Today, patient reports he is overall doing well  He started Aubagio in mid-end of April, currently on his 3rd month  He is tolerating well, denies any issues with the medication  He denies any new symptoms at this time  He has been compliant with monthly labs  Labs last completed 6/18/22  Normal LFTs, CBC  Vit D now 98    He completed a course of Rx vit D  He wanted to bring up long-standing concerns with memory  This is not new  He says he has issues with both short and long term memory  Examples include difficulty remembering details of conversations, forgetting names, forgetting why he walked into a room  Also, he reports some odd examples such as he can recall this past Saturday, but could not say anything he did in detail that day  Knows it was a "normal day" but would be unable to describe what he actually did that day  Additionally, he says he could not describe people he knows well, meaning their appearance  For instance, if asked to describe his mom, he said he could say she is a woman with dark hair  He cannot picture her in his head to describe her, although says he would have absolutely no issue picking her out of a crowd  He has longstanding sleep issues and requires taking Advil PM every night before bed  It takes him a while to fall asleep despite this and sleeps about 5 hours and then is up  He has absolutely no difficulty with completing daily activities, job duties etc   No difficulty with finances, driving  The following portions of the patient's history were reviewed and updated as appropriate: current medications, past family history, past medical history, past social history, past surgical history and problem list          Objective:    Blood pressure 121/69, pulse 84, temperature (!) 97 2 °F (36 2 °C), temperature source Tympanic, resp  rate 18, height 5' 10" (1 778 m), weight 62 3 kg (137 lb 6 4 oz)  Physical Exam  Constitutional:       Appearance: Normal appearance  HENT:      Head: Normocephalic and atraumatic  Eyes:      Extraocular Movements: EOM normal       Pupils: Pupils are equal, round, and reactive to light  Neurological:      Mental Status: He is alert  Coordination: Coordination is intact  Deep Tendon Reflexes: Strength normal and reflexes are normal and symmetric  Psychiatric:         Mood and Affect: Mood normal          Speech: Speech normal          Behavior: Behavior normal          Neurological Exam  Mental Status  Alert  Oriented to person, place, time and situation  Speech is normal  Language is fluent with no aphasia  Attention and concentration are normal     Cranial Nerves  CN II: Visual fields full to confrontation  CN III, IV, VI: Extraocular movements intact bilaterally  Pupils equal round and reactive to light bilaterally  CN V: Facial sensation is normal   CN VII: Full and symmetric facial movement  CN VIII: Hearing is normal   CN IX, X: Palate elevates symmetrically  CN XI: Shoulder shrug strength is normal   CN XII: Tongue midline without atrophy or fasciculations  Motor   Normal muscle tone  Strength is 5/5 throughout all four extremities  Sensory  Light touch is normal in upper and lower extremities  Reflexes  Deep tendon reflexes are 2+ and symmetric in all four extremities  Coordination    Finger-to-nose, rapid alternating movements and heel-to-shin normal bilaterally without dysmetria  Gait  Casual gait is normal including stance, stride, and arm swing  ROS:    Review of Systems   Constitutional: Negative for chills and fever  HENT: Negative for ear pain and sore throat  Eyes: Negative for pain and visual disturbance  Respiratory: Negative for cough and shortness of breath  Cardiovascular: Negative for chest pain and palpitations  Gastrointestinal: Negative for abdominal pain and vomiting  Genitourinary: Negative for dysuria and hematuria  Musculoskeletal: Positive for neck pain and neck stiffness  Negative for arthralgias and back pain  Skin: Negative for color change and rash  Neurological: Positive for dizziness, light-headedness and headaches  Negative for seizures and syncope  Psychiatric/Behavioral: Positive for sleep disturbance (trouble sleeping at night)     All other systems reviewed and are negative      I personally reviewed and updated the ROS as appropriate

## 2022-07-06 PROBLEM — R41.3 COMPLAINTS OF MEMORY DISTURBANCE: Status: ACTIVE | Noted: 2022-07-06

## 2022-07-06 NOTE — ASSESSMENT & PLAN NOTE
Completed course of Rx vit D, now maintaining on OTC and level is high end of normal   Will monitor

## 2022-07-06 NOTE — ASSESSMENT & PLAN NOTE
28year old male with MS diagnosed in 2016, started on Aubagio at time of diagnosis, and then was lost to follow up  He stopped Aubagio on his own in Dec 2018  He had a prolonged absence from our office between May 2017 and January 2022 when he presented to re-establish care  Imaging was recently obtained which showed significant disease progression compared to last imaging in 2016  He had at least 10 new, enhancing lesions in the brain and multiple new cervical cord lesions  He received 3 days of IV steroids 3/2-3/4/22, tolerated well  At time of visit 3/25/22 we discussed resuming DMT, and he chose to re-trial Aubagio, as he never failed this medication  He started Aubagio in mid-late April 2022 and is tolerating well  No reported side effects  Labs are being done regularly, most recently 6/18/22  Normal LFTs and CBC  He will continue getting labs done monthly with standing order for 6 months after starting Aubagio

## 2022-07-06 NOTE — ASSESSMENT & PLAN NOTE
Patient with complaints of memory disturbance, both short and long term  Examples are rather odd, such as he cannot tell me anything he did on Saturday but "remembers" the day  He also gives some examples of attentive/focus issues  He has absolutely no difficulty functioning day to day, no issues at work, driving, with finances etc     We discussed neuropsychological eval, as I cannot explain the issues he is describing

## 2022-07-16 ENCOUNTER — APPOINTMENT (OUTPATIENT)
Dept: LAB | Facility: MEDICAL CENTER | Age: 32
End: 2022-07-16
Payer: COMMERCIAL

## 2022-08-03 ENCOUNTER — TELEPHONE (OUTPATIENT)
Dept: NEUROLOGY | Facility: CLINIC | Age: 32
End: 2022-08-03

## 2022-08-04 ENCOUNTER — TELEPHONE (OUTPATIENT)
Dept: NEUROLOGY | Facility: CLINIC | Age: 32
End: 2022-08-04

## 2022-08-05 ENCOUNTER — TELEPHONE (OUTPATIENT)
Dept: NEUROLOGY | Facility: CLINIC | Age: 32
End: 2022-08-05

## 2022-08-05 NOTE — TELEPHONE ENCOUNTER
Spoke with Jenni Cushing and schedule intake and testing appointment   Intake is 9/21 at 10 am and testing is 9/28 at 9 am

## 2022-08-09 ENCOUNTER — TELEPHONE (OUTPATIENT)
Dept: NEUROLOGY | Facility: CLINIC | Age: 32
End: 2022-08-09

## 2022-08-16 ENCOUNTER — TELEPHONE (OUTPATIENT)
Dept: NEUROLOGY | Facility: CLINIC | Age: 32
End: 2022-08-16

## 2022-08-16 NOTE — TELEPHONE ENCOUNTER
Spoke with Mady Goldberg to reschedule his intake and testing in October as per his request  Intake is now 10/13 at 10 am and testing is 10/20 at 8:30 am

## 2022-08-20 ENCOUNTER — APPOINTMENT (OUTPATIENT)
Dept: LAB | Facility: MEDICAL CENTER | Age: 32
End: 2022-08-20
Payer: COMMERCIAL

## 2022-10-12 ENCOUNTER — TELEPHONE (OUTPATIENT)
Dept: NEUROLOGY | Facility: CLINIC | Age: 32
End: 2022-10-12

## 2022-10-13 ENCOUNTER — TELEMEDICINE (OUTPATIENT)
Dept: NEUROLOGY | Facility: CLINIC | Age: 32
End: 2022-10-13

## 2022-10-13 DIAGNOSIS — R41.3 COMPLAINTS OF MEMORY DISTURBANCE: Primary | ICD-10-CM

## 2022-10-13 DIAGNOSIS — G35 MULTIPLE SCLEROSIS (HCC): ICD-10-CM

## 2022-10-13 NOTE — LETTER
November 3, 2022     Claudia Martinez PA-C  [address] [facility]  [address]    Patient: Frances Cota   YOB: 1990   Date of Visit: 10/13/2022       Dear Dr Tj Varner:    Thank you for referring Connie Disla to me for evaluation  Below are my notes for this consultation  If you have questions, please do not hesitate to call me  Sincerely,        Steve Ortega PSYD        CC: MD Steve Mac PSYD  11/3/2022 11:42 AM  Sign when Signing Visit  Virtual Regular Visit     Encounter provider Steve Ortega PSYD     Provider located at [address] [place] [address] EVALUATION    Name:    Gerard Graham  YOB: 1990  Age:    28  Date of Service:  10/13/2022 (Interview by video);   10/20/2022 (Testing in person)   Referred By:   Franck Quinones PA-C, MD    The patient was identified by name and date of birth  Gerard Graham was informed that this is a telemedicine visit and that the visit is being conducted through [address] [place] [address] Now and patient was informed that this is a secure, HIPAA-compliant platform  He agrees to proceed  My office door was closed  No one else was in the room  He acknowledged consent and understanding of privacy and security of the video platform  The patient has agreed to participate and understands they can discontinue the visit at any time  Patient is aware this is a billable service  Verification of patient location: Patient is located in the following state in which I hold an active license: [place] [id number] is a 28 y o  right-handed  male referred by Claudia Martinez PA-C, for a neuropsychological evaluation of complaints of memory disturbance in the context of multiple sclerosis  CHIEF COMPLAINT  Patient reported trouble with both short-term and long-term memory   He explained that he remembers important events, but has difficulty with details - for example, of the day prior or what he ate - feels he runs on “auto-” and “goes through the motions ” Patient’s  stated that patient can remember events from childhood that are important/meaningful  He has noticed that his “processing runs a little slower than normal;” he frequently responds to what others say with “what?” Patient has been experiencing cognitive difficulty for a couple of years, but feels it is becoming more pronounced  He discussed impact on his marital relationship, as he is not remembering to do the tasks his  has asked  When asked about misplacing objects, patient stated that he has the experience of looking for his phone when it’s right next to him, occasionally forgetting his wallet in the car, and going to the wrong cabinets in his home  He endorsed word-finding difficulty which he described as “stuttering to find the right word;” of note, this was a symptom he experienced that led to evaluation and ultimately MS diagnosis  Patient’s  explained that patient sounds drunk when trying to find a word and speaks “gibberish ” He is able to retrieve names of people after seeing their face, but has difficulty with voice alone  Patient reported adequate focus, though needs music in the background  In silence, he frustrates more easily with work  He finds his brain runs “24/7,” and music assists with focus  Functionally, he is independent with ADLs and IADLs  He drives without recent car accidents or traffic violations  He will have difficulty navigating if he deviates from the typical route, even if he has been to the location previously  Patient’s  feels he wanders, has slower reaction time, and is temperamental on the road  Patient’s  does most of the shopping; patient does well with a short list and is “in and out ” He calls for any further clarifications on items needed   Patient’s cooking is limited to making pasta and using the air fryer  Patient does fine with basic functions of technology  Patient’s  is responsible for the majority of financial management  Patient has two-three personal credit cards that are on auto-pay  Patient denied trouble with medication management  His  is primarily responsible for schedule management  Patient relies on [employer] lamar for medical appointment reminders  Patient has difficulty remembering to do laundry on Fridays as has been their routine  Past Medical History:   Diagnosis Date   • Multiple sclerosis ([facility] [address] )          Current Outpatient Medications:   •  cholecalciferol (VITAMIN D3) 1,000 units tablet, Take 1,000 Units by mouth daily, Disp: , Rfl:   •  Multiple Vitamins-Minerals (multivitamin with minerals) tablet, Take 1 tablet by mouth daily  , Disp: , Rfl:   •  Teriflunomide (Aubagio) 14 MG TABS, Take 1 tablet (14 mg total) by mouth in the morning, Disp: 30 tablet, Rfl: 11  No current facility-administered medications for this visit  NEUROIMAGING  MRI 2/24/22, per chart  Multiple white matter lesions in a distribution compatible with multiple sclerosis, significantly worsened from prior exam  Multiple enhancing supratentorial lesions (at least 10), compatible with active demyelination  PSYCHOSOCIAL HISTORY  Developmental/Educational history:  Mr Rissa Newman reported that his mom led a “party life” and was exposed to second-hand smoke during the pregnancy  He was six days late  He met all developmental milestones within normal limits  He was raised in [place]  He was an “overachiever” in [employer] school  He mentioned that he was reading at a 7th/8th grade reading level in 3rd grade  He made [employer] in 6th grade and then “stopped caring ” He graduated high school “by the skin of [his] teeth ” He attended one semester plus one week of college  Occupational history:  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  He either remembers processes from work, or refers to logs  Social history:  Patient is  to his  of 8 years  Patient’s  stated that there is “no love life” between them  They have two dogs  Patient’s father  8 years ago from cancer  His mother is 67, with thyroid issues and osteoporosis  Patient does not have siblings  Patient has two maternal aunts with MS (one is )  Maternal grandmother was diagnosed with “early onset dementia” in her 80s and  at age 80, with the last year being particularly difficult  Patient named his  and his ’s family as his primary support network  Work was identified as his primary current stressor  He stated that he is “reluctantly good” at his job  He feels “80% better” when he logs off work for the day  He initially denied having any hobbies, but then stated that he enjoys videogames and music  Psychiatric history:  Patient denied a mental health history  When asked about his current mood, he stated, “usually pretty nothing ” He stated that he is stressed and angry when at work, but that “everything else is what it is…normal for me ” Patient does not cry  Laughing is his “first and natural reaction ” He denied depression and apathy, though  endorsed apathy  Patient denied anxiety, luisana, or psychotic symptoms  He acknowledged a “short fuse ” He denied current or past SI or HI  Patient’s  asks patient what’s wrong multiple times/day  He described his variability in emotion as 1-1A (versus a normal 1-10)  He stated that their relationship is devoid of emotion  Patient has a shorter fuse lately when asked to do things  He is tangential when working with ’s dad on the weekends  He expresses irrelevant thoughts  Patient experiences sleep difficulty, sleeping only 2 5-3 hours typically, with 5 hours as his max   He has trouble with both sleep onset and maintenance  He goes to bed around 830/9 PM, but falls asleep closer to 11/1130 PM  He typically awakens between 2 and 330 AM  He is up for the day around 7/730 AM  His daytime energy is “not great ” He finds himself yawning all day  He “crashes” by 630/7 PM  Appetite and weight have been stable  Alcohol/Drug use history:  Patient consumes 1-2 beers over the course of the week, with 2-3 on the weekend  He consumes more alcohol during the summertime  He drank more in young adulthood, without any negative consequences  He denied use of tobacco or illicit substances  BEHAVIORAL OBSERVATIONS  During the interview, Mr Juanito Joel appeared appropriately groomed and dressed  He was alert and fully oriented  His , Hiwot Schafer, accompanied him  Affect was within normal limits  Thought process was goal-directed  Speech was fluent  During the testing session, Mr Juanito Joel was consistently alert and attentive  He appeared very motivated and cooperated with all testing procedures  He recognized his difficulties and coped well  TESTS ADMINISTERED  Wechsler Adult Intelligence Scale (WAIS-IV; selected subtests)  Wechsler Memory Scale (WMS-IV; selected subtests)  [place] Neuropsychological Battery: Forced Choice Test; Bobo Complex Figure Test; Animal Naming; One-Minute Time Estimation; Controlled Oral Word Association Test; Dichotic Listening; Verizon Adult Reading Test (NAART); Sentence Repetition Test; Bobo Auditory Verbal Learning Test; Judgment of Line Orientation; Karl & Karl; Finger Tapping Test; Finger Localization; Trail Making Test A and B;  Token Test; [place] Category Test - [place] Version  Stroop Color and Word Test  Continuous Performance Test (CPT 3)  Del Valle Depression Inventory (BDI-II)  [place] Multiphasic Personality Jlujaglqw-7-Eyjuokrgyhqs Form (MMPI-2-RF)    NEUROPSYCHOLOGICAL FINDINGS  Mr Juanito Joel was administered a battery of neuropsychological measures (listed above) which assessed his abilities compared to individuals matched for age, education, gender, ethnicity, and handedness, as appropriate  The patient’s performance on multiple validity procedures indicates that the cognitive data obtained during this assessment is likely valid for interpretation  PREMORBID INTELLECTUAL FUNCTIONING  The patient’s premorbid intellectual functioning is estimated to be in the average range  Performance on subsequent measures will be considered relative to this premorbid estimate  Overall cognitive performance on this evaluation was in the average range, and within his expected performance level  PROCESSING SPEED  Overall, cognitive speed and efficiency of information processing is in the average range, and within his expected performance level  Visual scanning and matching of novel symbols was in the average range  Graphic symbol-digit substitution was in the low average range  Timed visual scanning and simple numerical sequencing was in the upper end of the low average range  On the Stroop, word reading speed was in the average range, and color naming speed was in the high average range  ATTENTION AND WORKING MEMORY  Overall, attention and working memory is in the average range, and within his expected performance level  Auditory attention for numeric sequences was in the average range  His ability to keep information in mind when performing arithmetic problems was in the high average range  The patient’s capacity to immediately recite words from a random auditory list was in the average range  Attention for contextual auditory-verbal information was in the moderately deficient range  Sustained auditory attention, assessed by a task requiring the patient to attend to and process multiple auditory stimuli simultaneously, was in the low average range   Performance on a computerized measure of sustained visual attention was within normal limits without indication of problems with inattentiveness, impulsivity, sustained attention, or maintaining vigilance  LANGUAGE  Overall, verbal expression and conceptualization is in the average range, with performance on all tasks within his expected performance level  Auditory processing was in the low average range for both ears  Basic comprehension and execution of simple instructions was entirely accurate and in the high average range  Word knowledge and  deductive reasoning skills were in the high average range  Expression of general factual knowledge was in the average range  Confrontational naming was in the average range  Semantic fluency by category was in the average range, and verbal fluency by starting letter was in the low average range  VISUOSPATIAL AND CONSTRUCTIONAL FUNCTIONING   Perceptual organizational abilities are consistently in the average range, and within his expected performance level  Visual spatial conceptual organization when manipulating blocks to form a copy of a modeled design was in the average range  Visual-spatial abstract reasoning when identifying the missing segment to complete an abstract patterned design was in the average range  His perceptual attuneness to fine visual detail of familiar stimuli was in the average range  Visual constructional skills were in the average range  Visuospatial judgment and estimation based on line angulation between 0 and 180 degrees was in the average range  EXECUTIVE FUNCTIONING  Overall, reasoning and problem-solving skills are in the average range, with performance on all tasks within (or above) his expected performance level  Mental flexibility as measured with alphanumeric set switching was in the average range  Visual analytic reasoning and novel problem-solving was in the superior range  Selective attention assessed with the Stroop was in the average to superior range  Verbal abstract conceptual reasoning was in the high average range   Verbal fluency by starting letter was in the low average range  MEMORY  VERBAL MEMORY  Overall, the patient’s ability for learning, encoding, and recalling auditory verbal information is in the average range, and within his expected performance level  For a 15-word list, the amount of information absorbed from single exposure was in the average range (7)  He demonstrated average learning over five trials (7, 10, 10, 12, 13)  After repeated exposures, the amount of information retained was in the average range  His recall of the original word list after distraction with a second word list was in the high average range (13)  After a 30-minute delay, recall was entirely accurate and in the superior range (15)  Recognition of the words was entirely accurate (15), with superior discriminability (no false positives)  Regarding verbal episodic memory of paragraph-length stories, the patient performed in the mildly deficient range for immediate recall, and in the average range for delayed recall  Recognition was above average  VISUAL MEMORY  Overall, the patient’s ability for learning, encoding, and recalling visual-spatial information is in the average range, with performance on all tasks within (or above) his expected performance level  Short-term recall of a complex figure (after distraction) was in the high average range  Delayed recall (after 30 minutes) was in the high average range  Delayed recognition of components of the design was in the average range  SENSORY-MOTOR  Fine motor speed was in the low average range for the dominant hand, and mildly deficient for the non-dominant hand  Tactile perception was entirely accurate for both hands  EMOTIONAL FUNCTIONING  On the BDI-2, the patient scored 29, indicating severe depressive symptomatology   He endorsed severe crying, agitation, and loss of interest in sex; moderate past failure, self-dislike, self-criticalness, loss of interest, worthlessness, concentration difficulty, and fatigue; and mild sadness, pessimism, loss of pleasure, indecisiveness, loss of energy, and irritability  Validity scales on the MMPI-2-RF suggest that the protocol may be invalid due to endorsement of considerably larger than average number of infrequent responses and an unusual combination of responses associated with noncredible memory complaints  However, this may rather indicate severe emotional distress  Thus, responses to clinical scales are interpreted with caution  Mr Jose Chapman responses indicate considerable emotional distress that is likely to be perceived as a crisis  He endorsed a broad range of symptoms associated with demoralization, low positive emotions, and negative emotional experiences  He reported a diffuse pattern of somatic complaints involving different bodily systems including cognitive difficulties, vague neurological complaints, a general sense of malaise, and head pain  He reported significant past and current substance abuse  Interpersonally, he indicated that he tends to be unassertive, avoidant of social situations, shy, and dislikes being around others  SUMMARY and IMPRESSIONS  Performance on this comprehensive neuropsychological evaluation revealed intact cognitive functioning  Mr Jose Chapman overall performance in each cognitive domain assessed was in the average range relative to demographically-similar peers, and within his expected performance level based on estimated premorbid intellectual functioning  Mr Jose Chapman pattern of performance is not suggestive of neurocognitive impact from multiple sclerosis  On a task level, Mr Elpidio Carrera demonstrated circumscribed weaknesses in sentence repetition (moderately deficient) and short-term recall of contextual verbal material (mildly deficient); performance on all other tasks across the entire evaluation was within normal limits relative to his cohort   Mr Elpidio Carrera demonstrated notable strengths in long-term recall of noncontextual verbal material, visual analytic reasoning and novel problem-solving, and selective attention (superior range)  Psychological measures included in this evaluation suggest that Mr Mclaughlin is experiencing clinically significant depressive symptomatology  Mr Jose Chapman experience of cognitive difficulty (or perception thereof) is likely related to emotional distress  I strongly recommend that he consider treatment, whether psychopharmacological and/or counseling  Thank you for this referral  Please feel free to contact me with any questions  Judy Phelan PsyD  Clinical Neuropsychologist    VIRTUAL VISIT DISCLAIMER     Po Chiang verbally agrees to participate in [employer]  Pt is aware that [employer] could be limited without vital signs or the ability to perform a full hands-on physical exam  Po Chiang understands he or the provider may request at any time to terminate the video visit and request the patient to seek care or treatment in person

## 2022-10-20 ENCOUNTER — OFFICE VISIT (OUTPATIENT)
Dept: NEUROLOGY | Facility: CLINIC | Age: 32
End: 2022-10-20

## 2022-10-20 DIAGNOSIS — G35 MULTIPLE SCLEROSIS (HCC): ICD-10-CM

## 2022-10-20 DIAGNOSIS — R41.3 COMPLAINTS OF MEMORY DISTURBANCE: Primary | ICD-10-CM

## 2022-11-01 ENCOUNTER — TELEPHONE (OUTPATIENT)
Dept: NEUROLOGY | Facility: CLINIC | Age: 32
End: 2022-11-01

## 2022-11-02 ENCOUNTER — HOSPITAL ENCOUNTER (OUTPATIENT)
Dept: MRI IMAGING | Facility: HOSPITAL | Age: 32
Discharge: HOME | End: 2022-11-02

## 2022-11-02 DIAGNOSIS — G35 MULTIPLE SCLEROSIS (HCC): ICD-10-CM

## 2022-11-02 RX ADMIN — GADOBUTROL 6 ML: 604.72 INJECTION INTRAVENOUS at 23:37

## 2022-11-03 ENCOUNTER — TELEMEDICINE (OUTPATIENT)
Dept: NEUROLOGY | Facility: CLINIC | Age: 32
End: 2022-11-03

## 2022-11-03 DIAGNOSIS — R41.3 COMPLAINTS OF MEMORY DISTURBANCE: Primary | ICD-10-CM

## 2022-11-03 DIAGNOSIS — G35 MULTIPLE SCLEROSIS (HCC): ICD-10-CM

## 2022-11-03 NOTE — PROGRESS NOTES
Melisa,pt last seen by you  See neuro cognitive eval   Overall pt cogntive function relatively intact  However per dr Diamond Conn, concern for depressive symptomatology     Per testing, pt recommended to have further mood eval  Please see if pt agreeable to psychiatry referral

## 2022-11-03 NOTE — PROGRESS NOTES
Virtual Regular Visit  Encounter provider Marguerite Lane PSYD     Provider located at   [address] [place] [address]    The patient was identified by name and date of birth  Brandon Nash was informed that this is a telemedicine visit and that the visit is being conducted through [address] Now and patient was informed that this is a secure, HIPAA-compliant platform  He agrees to proceed  My office door was closed  No one else was in the room  He acknowledged consent and understanding of privacy and security of the video platform  The patient has agreed to participate and understands they can discontinue the visit at any time  Patient is aware this is a billable service  Verification of patient location: Patient is located in the following state in which I hold an active license: [place]    Feedback following neuropsychological evaluation:  Provided feedback to patient and  via Arlene Side from neuropsychological evaluation initiated on 10/13/22 and completed on 10/20/22  Discussed intact cognitive functioning, with overall performance in each cognitive domain in the average range  Discussed specific task strengths and weaknesses  Explained that pattern of performance is not suggestive of neurocognitive impact from multiple sclerosis  Explained and discussed findings from psychological measures that patient is experiencing clinically significant depressive symptomatology  While patient stated he does not “feel depressed,” he understood that his endorsement of additional symptoms of depression other than depressed mood/crying suggests this may be a factor in his presentation and may impact his cognitive performance/perception of such  Discussed treatment options for depression, and encouraged patient to consider and discuss further with neurology provider and/or PCP  Report routed to Dr Joanette Councilman and Ms Ras Rodriguez; patient is scheduled for neurological follow-up on 11/14/22  Judy Beck PsyD  Clinical Neuropsychologist    VIRTUAL VISIT DISCLAIMER   Elian Tan verbally agrees to participate in [employer]  Pt is aware that [employer] could be limited without vital signs or the ability to perform a full hands-on physical exam  Elian Tan understands he or the provider may request at any time to terminate the video visit and request the patient to seek care or treatment in person      Billing notes:  21072: 1 unit; 68511: 1 unit (Interview 93 min) *including 62 min virtual  29921: 1 unit (Neuropsychologist Scoring 36 min)  33034: 1 unit; 07187: 8 units (Psychometrist Testing/Scoring 258 min)  56145: 1 unit; 71401: 1 unit (Chart Review/Interpretation/Feedback 122 min) *including 35 min virtual

## 2022-11-03 NOTE — PROGRESS NOTES
Virtual Regular Visit     Encounter provider Kaden Ramos PSYD     Provider located at [facility] [address] EVALUATION    Name:    Angela Cassidy  YOB: 1990  Age:    28  Date of Service:  10/13/2022 (Interview by video);   10/20/2022 (Testing in person)   Referred By:   Analisa Stokes PA-C, Martinez Gaines MD    The patient was identified by name and date of birth  Angela Cassidy was informed that this is a telemedicine visit and that the visit is being conducted through [address] and patient was informed that this is a secure, HIPAA-compliant platform  He agrees to proceed  My office door was closed  No one else was in the room  He acknowledged consent and understanding of privacy and security of the video platform  The patient has agreed to participate and understands they can discontinue the visit at any time  Patient is aware this is a billable service  Verification of patient location: Patient is located in the following state in which I hold an active license: [place] [id number] is a 28 y o  right-handed  male referred by Analisa Stokes PA-C, for a neuropsychological evaluation of complaints of memory disturbance in the context of multiple sclerosis  CHIEF COMPLAINT  Patient reported trouble with both short-term and long-term memory  He explained that he remembers important events, but has difficulty with details - for example, of the day prior or what he ate - feels he runs on “auto-” and “goes through the motions ” Patient’s  stated that patient can remember events from childhood that are important/meaningful  He has noticed that his “processing runs a little slower than normal;” he frequently responds to what others say with “what?” Patient has been experiencing cognitive difficulty for a couple of years, but feels it is becoming more pronounced   He discussed impact on his marital relationship, as he is not remembering to do the tasks his  has asked  When asked about misplacing objects, patient stated that he has the experience of looking for his phone when it’s right next to him, occasionally forgetting his wallet in the car, and going to the wrong cabinets in his home  He endorsed word-finding difficulty which he described as “stuttering to find the right word;” of note, this was a symptom he experienced that led to evaluation and ultimately MS diagnosis  Patient’s  explained that patient sounds drunk when trying to find a word and speaks “gibberish ” He is able to retrieve names of people after seeing their face, but has difficulty with voice alone  Patient reported adequate focus, though needs music in the background  In silence, he frustrates more easily with work  He finds his brain runs “24/7,” and music assists with focus  Functionally, he is independent with ADLs and IADLs  He drives without recent car accidents or traffic violations  He will have difficulty navigating if he deviates from the typical route, even if he has been to the location previously  Patient’s  feels he wanders, has slower reaction time, and is temperamental on the road  Patient’s  does most of the shopping; patient does well with a short list and is “in and out ” He calls for any further clarifications on items needed  Patient’s cooking is limited to making pasta and using the air fryer  Patient does fine with basic functions of technology  Patient’s  is responsible for the majority of financial management  Patient has two-three personal credit cards that are on auto-pay  Patient denied trouble with medication management  His  is primarily responsible for schedule management  Patient relies on Darin Lock lamar for medical appointment reminders   Patient has difficulty remembering to do laundry on Fridays as has been their routine  Past Medical History:   Diagnosis Date   • Multiple sclerosis ([facility] [address] )          Current Outpatient Medications:   •  cholecalciferol (VITAMIN D3) 1,000 units tablet, Take 1,000 Units by mouth daily, Disp: , Rfl:   •  Multiple Vitamins-Minerals (multivitamin with minerals) tablet, Take 1 tablet by mouth daily  , Disp: , Rfl:   •  Teriflunomide (Aubagio) 14 MG TABS, Take 1 tablet (14 mg total) by mouth in the morning, Disp: 30 tablet, Rfl: 11  No current facility-administered medications for this visit  NEUROIMAGING  MRI 22, per chart  Multiple white matter lesions in a distribution compatible with multiple sclerosis, significantly worsened from prior exam  Multiple enhancing supratentorial lesions (at least 10), compatible with active demyelination  PSYCHOSOCIAL HISTORY  Developmental/Educational history:  Mr Daron Soulier reported that his mom led a “party life” and was exposed to second-hand smoke during the pregnancy  He was six days late  He met all developmental milestones within normal limits  He was raised in [place]  He was an “overachiever” in [employer] school  He mentioned that he was reading at a 7th/8th grade reading level in 3rd grade  He made [employer] in 6th grade and then “stopped caring ” He graduated high school “by the skin of [his] teeth ” He attended one semester plus one week of college  Occupational history:  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  He either remembers processes from work, or refers to logs  Social history:  Patient is  to his  of 8 years  Patient’s  stated that there is “no love life” between them  They have two dogs  Patient’s father  8 years ago from cancer  His mother is 67, with thyroid issues and osteoporosis  Patient does not have siblings  Patient has two maternal aunts with MS (one is )  Maternal grandmother was diagnosed with “early onset dementia” in her 80s and  at age 80, with the last year being particularly difficult  Patient named his  and his ’s family as his primary support network  Work was identified as his primary current stressor  He stated that he is “reluctantly good” at his job  He feels “80% better” when he logs off work for the day  He initially denied having any hobbies, but then stated that he enjoys videogames and music  Psychiatric history:  Patient denied a mental health history  When asked about his current mood, he stated, “usually pretty nothing ” He stated that he is stressed and angry when at work, but that “everything else is what it is…normal for me ” Patient does not cry  Laughing is his “first and natural reaction ” He denied depression and apathy, though  endorsed apathy  Patient denied anxiety, luisana, or psychotic symptoms  He acknowledged a “short fuse ” He denied current or past SI or HI  Patient’s  asks patient what’s wrong multiple times/day  He described his variability in emotion as 1-1A (versus a normal 1-10)  He stated that their relationship is devoid of emotion  Patient has a shorter fuse lately when asked to do things  He is tangential when working with ’s dad on the weekends  He expresses irrelevant thoughts  Patient experiences sleep difficulty, sleeping only 2 5-3 hours typically, with 5 hours as his max  He has trouble with both sleep onset and maintenance  He goes to bed around 830/9 PM, but falls asleep closer to 1130 PM  He typically awakens between 2 and 330 AM  He is up for the day around 7/730 AM  His daytime energy is “not great ” He finds himself yawning all day  He “crashes” by 630/7 PM  Appetite and weight have been stable  Alcohol/Drug use history:  Patient consumes 1-2 beers over the course of the week, with 2-3 on the weekend  He consumes more alcohol during the summertime   He drank more in young adulthood, without any negative consequences  He denied use of tobacco or illicit substances  BEHAVIORAL OBSERVATIONS  During the interview, Mr Juanito Joel appeared appropriately groomed and dressed  He was alert and fully oriented  His , Hiwot Schafer, accompanied him  Affect was within normal limits  Thought process was goal-directed  Speech was fluent  During the testing session, Mr Juanito Joel was consistently alert and attentive  He appeared very motivated and cooperated with all testing procedures  He recognized his difficulties and coped well  TESTS ADMINISTERED  Wechsler Adult Intelligence Scale (WAIS-IV; selected subtests)  Wechsler Memory Scale (WMS-IV; selected subtests)  Greenhorn Neuropsychological Battery: Forced Choice Test; Bobo Complex Figure Test; Animal Naming; One-Minute Time Estimation; Controlled Oral Word Association Test; Dichotic Listening; [employer] Adult Reading Test (NAART); Sentence Repetition Test; Bobo Auditory Verbal Learning Test; Judgment of Line Orientation; Karl & Karl; Finger Tapping Test; Finger Localization; Trail Making Test A and B; Token Test; [place] Category Test - [place] Version  Stroop Color and Word Test  Continuous Performance Test (CPT 3)  Del Valle Depression Inventory (BDI-II)  [place] Multiphasic Personality Uomutitfu-0-Wphxcinhcrwa Form (MMPI-2-RF)    NEUROPSYCHOLOGICAL FINDINGS  Mr Juanito Joel was administered a battery of neuropsychological measures (listed above) which assessed his abilities compared to individuals matched for age, education, gender, ethnicity, and handedness, as appropriate  The patient’s performance on multiple validity procedures indicates that the cognitive data obtained during this assessment is likely valid for interpretation  PREMORBID INTELLECTUAL FUNCTIONING  The patient’s premorbid intellectual functioning is estimated to be in the average range   Performance on subsequent measures will be considered relative to this premorbid estimate  Overall cognitive performance on this evaluation was in the average range, and within his expected performance level  PROCESSING SPEED  Overall, cognitive speed and efficiency of information processing is in the average range, and within his expected performance level  Visual scanning and matching of novel symbols was in the average range  Graphic symbol-digit substitution was in the low average range  Timed visual scanning and simple numerical sequencing was in the upper end of the low average range  On the Stroop, word reading speed was in the average range, and color naming speed was in the high average range  ATTENTION AND WORKING MEMORY  Overall, attention and working memory is in the average range, and within his expected performance level  Auditory attention for numeric sequences was in the average range  His ability to keep information in mind when performing arithmetic problems was in the high average range  The patient’s capacity to immediately recite words from a random auditory list was in the average range  Attention for contextual auditory-verbal information was in the moderately deficient range  Sustained auditory attention, assessed by a task requiring the patient to attend to and process multiple auditory stimuli simultaneously, was in the low average range  Performance on a computerized measure of sustained visual attention was within normal limits without indication of problems with inattentiveness, impulsivity, sustained attention, or maintaining vigilance  LANGUAGE  Overall, verbal expression and conceptualization is in the average range, with performance on all tasks within his expected performance level  Auditory processing was in the low average range for both ears  Basic comprehension and execution of simple instructions was entirely accurate and in the high average range   Word knowledge and  deductive reasoning skills were in the high average range  Expression of general factual knowledge was in the average range  Confrontational naming was in the average range  Semantic fluency by category was in the average range, and verbal fluency by starting letter was in the low average range  VISUOSPATIAL AND CONSTRUCTIONAL FUNCTIONING   Perceptual organizational abilities are consistently in the average range, and within his expected performance level  Visual spatial conceptual organization when manipulating blocks to form a copy of a modeled design was in the average range  Visual-spatial abstract reasoning when identifying the missing segment to complete an abstract patterned design was in the average range  His perceptual attuneness to fine visual detail of familiar stimuli was in the average range  Visual constructional skills were in the average range  Visuospatial judgment and estimation based on line angulation between 0 and 180 degrees was in the average range  EXECUTIVE FUNCTIONING  Overall, reasoning and problem-solving skills are in the average range, with performance on all tasks within (or above) his expected performance level  Mental flexibility as measured with alphanumeric set switching was in the average range  Visual analytic reasoning and novel problem-solving was in the superior range  Selective attention assessed with the Stroop was in the average to superior range  Verbal abstract conceptual reasoning was in the high average range  Verbal fluency by starting letter was in the low average range  MEMORY  VERBAL MEMORY  Overall, the patient’s ability for learning, encoding, and recalling auditory verbal information is in the average range, and within his expected performance level  For a 15-word list, the amount of information absorbed from single exposure was in the average range (7)  He demonstrated average learning over five trials (7, 10, 10, 12, 13)   After repeated exposures, the amount of information retained was in the average range  His recall of the original word list after distraction with a second word list was in the high average range (13)  After a 30-minute delay, recall was entirely accurate and in the superior range (15)  Recognition of the words was entirely accurate (15), with superior discriminability (no false positives)  Regarding verbal episodic memory of paragraph-length stories, the patient performed in the mildly deficient range for immediate recall, and in the average range for delayed recall  Recognition was above average  VISUAL MEMORY  Overall, the patient’s ability for learning, encoding, and recalling visual-spatial information is in the average range, with performance on all tasks within (or above) his expected performance level  Short-term recall of a complex figure (after distraction) was in the high average range  Delayed recall (after 30 minutes) was in the high average range  Delayed recognition of components of the design was in the average range  SENSORY-MOTOR  Fine motor speed was in the low average range for the dominant hand, and mildly deficient for the non-dominant hand  Tactile perception was entirely accurate for both hands  EMOTIONAL FUNCTIONING  On the BDI-2, the patient scored 29, indicating severe depressive symptomatology  He endorsed severe crying, agitation, and loss of interest in sex; moderate past failure, self-dislike, self-criticalness, loss of interest, worthlessness, concentration difficulty, and fatigue; and mild sadness, pessimism, loss of pleasure, indecisiveness, loss of energy, and irritability  Validity scales on the MMPI-2-RF suggest that the protocol may be invalid due to endorsement of considerably larger than average number of infrequent responses and an unusual combination of responses associated with noncredible memory complaints  However, this may rather indicate severe emotional distress  Thus, responses to clinical scales are interpreted with caution   Mr  Lissette’s responses indicate considerable emotional distress that is likely to be perceived as a crisis  He endorsed a broad range of symptoms associated with demoralization, low positive emotions, and negative emotional experiences  He reported a diffuse pattern of somatic complaints involving different bodily systems including cognitive difficulties, vague neurological complaints, a general sense of malaise, and head pain  He reported significant past and current substance abuse  Interpersonally, he indicated that he tends to be unassertive, avoidant of social situations, shy, and dislikes being around others  SUMMARY and IMPRESSIONS  Performance on this comprehensive neuropsychological evaluation revealed intact cognitive functioning  Mr July Whitehead overall performance in each cognitive domain assessed was in the average range relative to demographically-similar peers, and within his expected performance level based on estimated premorbid intellectual functioning  Mr July Whitehead pattern of performance is not suggestive of neurocognitive impact from multiple sclerosis  On a task level, Mr Radha Phillips demonstrated circumscribed weaknesses in sentence repetition (moderately deficient) and short-term recall of contextual verbal material (mildly deficient); performance on all other tasks across the entire evaluation was within normal limits relative to his cohort  Mr Radha Phillips demonstrated notable strengths in long-term recall of noncontextual verbal material, visual analytic reasoning and novel problem-solving, and selective attention (superior range)  Psychological measures included in this evaluation suggest that Mr Mclaughlin is experiencing clinically significant depressive symptomatology  Mr July Whitehead experience of cognitive difficulty (or perception thereof) is likely related to emotional distress   I strongly recommend that he consider treatment, whether psychopharmacological and/or counseling  Thank you for this referral  Please feel free to contact me with any questions  Judy Pitts PsyD  Clinical Neuropsychologist    VIRTUAL VISIT DISCLAIMER     Tishajordan Plummer verbally agrees to participate in [employer]  Pt is aware that [employer] could be limited without vital signs or the ability to perform a full hands-on physical exam  Tishajordan Plummer understands he or the provider may request at any time to terminate the video visit and request the patient to seek care or treatment in person

## 2022-11-07 ENCOUNTER — TELEPHONE (OUTPATIENT)
Dept: PSYCHIATRY | Facility: CLINIC | Age: 32
End: 2022-11-07

## 2022-11-07 NOTE — TELEPHONE ENCOUNTER
Behavorial Health Outpatient Intake Questions    Referred by: PCP     Please advised interviewee that they need to answer all questions truthfully to allow for best care and any misrepresentations of information may affect their ability to be seen at this clinic   => Was this discussed? Yes     [facility] -     Presenting Problem (in patient's words):     Pt stated that he recently went to his PCP and rated high on the depression scale  PCP recommened that he seek therapy before seeking med mgmt  Pt stated that he has lost of interest and sex  Finds it hard to find the joys in life  Are there any developmental disabilities? ? If yes, can they speak to you on the phone? If they are too limited to speak to you on phone, refer out No    Are you taking any psychiatric medications? No    => If yes, who prescribes? If yes, are they injectable medications? Does the patient have a language barrier or hearing impairment? No    Have you been [facility] at [facility] by a therapist or a doctor in the past? If yes, who? No    Has the patient been hospitalized for mental health? No   If yes, how long ago was last hospitalization and where was it? Do you actively use alcohol or marijuana or illegal substances? If yes, what and how much - refer out to Drug and alcohol treatment if use is excessive or daily use of illegal substances No concerns of substance abuse are reported  Do you have a community treatment team or ? No    Legal History-     Does the patient have any history of arrests, prison/care home time, or DUIs? No  If Yes-  1) What types of charges? 2) When were they last incarcerated? 3) Are they currently on parole or probation? Minor Child-    Who has custody of the child? Is there a custody agreement? If there is a custody agreement remind parent that they must bring a copy to the first appt or they will not be seen       Intake Team, please check with provider before scheduling if flags come up such as:  - complex case  - legal history (other than DUI)  - communication barrier concerns are present  - if, in your judgment, this needs further review    ACCEPTED as a patient Yes  => Appointment Date: 11/16/202 with Ella Lagos   Referred Elsewhere? No    Name of Lolis Colmenares Duong #[id number]  Insurance Phone #  If ins is primary or secondary  If patient is a minor, parents information such as Name, D  O B of guarantor

## 2022-11-14 ENCOUNTER — OFFICE VISIT (OUTPATIENT)
Dept: NEUROLOGY | Facility: CLINIC | Age: 32
End: 2022-11-14

## 2022-11-14 VITALS
TEMPERATURE: 97.9 F | DIASTOLIC BLOOD PRESSURE: 81 MMHG | BODY MASS INDEX: 19.33 KG/M2 | SYSTOLIC BLOOD PRESSURE: 127 MMHG | HEART RATE: 83 BPM | HEIGHT: 70 IN | WEIGHT: 135 LBS

## 2022-11-14 DIAGNOSIS — G35 MULTIPLE SCLEROSIS (HCC): Primary | ICD-10-CM

## 2022-11-14 NOTE — ASSESSMENT & PLAN NOTE
Britany Jeffries is a very pleasant 26-year-old year old male with history of multiple sclerosis who presents for follow up  He was last seen in July and has been stable since then  He continues to be compliant with the Aubagio  He denies any new MS symptoms  He was evaluated by neuropsychology and there was normal cognitive functioning  There was concern for depression  Patient has an upcoming appointment to start working with a therapist   Otherwise, he denies any focal neurological deficit  No recent falls, difficulty walking, numbness/tingling or urinary incontinence  He has not seen an ophthalmologist yet  Exam unremarkable  Blood work normal with normal ANC an ALC      Plan:  · Continue daily Aubagio  · Follow-up blood work in December  · Follow-up with therapist to address possible underlying depression  · Establish care with an ophthalmologist for dilated eye exam  · Follow-up with Dr Mandi Mera or Cristofer Shields in 6 months

## 2022-11-14 NOTE — PROGRESS NOTES
Patient ID: Tennille Baum is a 28 y o  male  Assessment/Plan:    [facility]  Hanna Dozier is a very pleasant 66-year-old year old male with history of multiple sclerosis who presents for follow up  He was last seen in July and has been stable since then  He continues to be compliant with the Aubagio  He denies any new MS symptoms  He was evaluated by neuropsychology and there was normal cognitive functioning  There was concern for depression  Patient has an upcoming appointment to start working with a therapist   Otherwise, he denies any focal neurological deficit  No recent falls, difficulty walking, numbness/tingling or urinary incontinence  He has not seen an ophthalmologist yet  Exam unremarkable  Blood work normal with normal ANC an ALC  Plan:  · Continue daily Aubagio  · Follow-up blood work in December  · Follow-up with therapist to address possible underlying depression  · Establish care with an ophthalmologist for dilated eye exam  · Follow-up with Dr Saúl Chan or Maksim Jansen in 6 months       Diagnoses and all orders for this visit:    Multiple sclerosis [facility])         Subjective:    HPI    Hanna Dozier is a very pleasant 66-year-old year old male with history of multiple sclerosis who presents for follow up  He was last seen by Maksim Jansen on 7/5/2022  As per Joann Gomez "To review, he was initially seen in Sept 2016 for eval of newly diagnosed MS  Patient had symptoms starting in April 2016 with some issues with his speech  He notes he would stop in the middle of the sentence, or have slurring of his speech  PCP ordered MRI and labs  Patient notes first MRI was done without contrast, and he was brought back in for contrasted study  Patient notes he was told by PCP that he likely had MS and was sent to [facility] neurology  He was seen in May 2016 by [facility] neurology and had additional studies including MRI c-spine and t-spine   Patient notes he was told he was going to start Tysabri, but no discussion of the med  When he researched the med on his own, he had concerns about taking the med as his first line therapy  MRI brain 4/2016 no acute infarct  Multiple primarily sub centimeter FLAIR hyperintense lesions present within the subcortical WM, juxtacortical location and deep PV white matter  Additionally a single small lesion present within the splenium of the corpus callosum  At least one of the lesions has a somewhat Robert’s finger-like morphology  The totally number of lesions is approximately 15 with the largest at 12mm within the subcortical WM of the left parietal lobe  Although non-specific, given the number and location of the lesions, most concerning for demyelinating process  Patient went back with contrast and there were several areas of enhancement notes within the subcortical WM of the frontal lobes bilaterally, left parietal, and madeline temporal lobes  MRI t-spine done 6/3/16 no cord lesions  MRI c-spine 6/3/16 no clear evidence of focal cord demyelination, mild DDD, no spinal stenosis  Question of artifact on sagittal films  JCV negative with index of 0 17, done on Oct 17, 2016  Patient started Darreld [place] as his first medication in late 2016  Patient returned to our office after 5 years absence  He reported he stopped Aubagio in December 2018  He felt he was doing well and did not feel he needed medication or follow up  He reported in the months leading up to returning to our office, he did notice some new symptoms including right-sided weakness, right hand tingling, occasional blurry vision  Following his visit to re-establish care, he had updated imaging completed  MRI brain 2/24/2022 compared to 4/2016  Significant disease progression noted including at least 10 enhancing supratentorial lesions  MRI c-spine 2/24/2022 compared to 6/2016   Multiple white matter lesions scattered throughout cervical spinal cord and possibly upper thoracic cord, new since prior exam   No enhancing cord lesions  Patient was called with these results and IV steroids were offered  He completed 3 days of IV steroids 3/2, 3/3 and 3/4, overall tolerated well  Labs completed 2/19/22  Vit D low at 16  Neg Lyme, Sjogren's, Quantiferon Gold TB  JCV now positive with index 0 87  Normal CBC and CMP  Patient was seen on 3/25/22 and we discussed re-initiating DMT due to evidence of disease progression on imaging  He wanted to re-trial Aubagio, and start form was signed      Patient has been doing well since he was last seen in July  He got shingles along his L thoracic region 3 weeks ago  Just itching, no burning  He denies any new focal neurological deficit  He denies recent falls, difficulty walking, any new numbness or tingling or urinary incontinence  He states that he has difficulty adjusting to the dark after being in light  He has never seen an ophthalmologist before  Patient was recently evaluated by neuropsychology  This showed intact cognitive functioning  Concerns were raised for depression  Patient has an upcoming appointment with therapy  Patient states that he does not feel depressed in any way  States that he does not like himself however this is been longstanding  He denies feeling suicidal   He states that he does have a loss of interest in things and loss of libido  He has poor sleep at baseline  His energy comes and goes  He denies any feelings of sadness or crying  Patient is eating well  He states that he has a short fuse  Patient is taking 1000 units of vitamin-D daily  He is compliant with his Obagi 0 and has not missed any doses  His blood work today is normal   ANC 2 31, ALC 1 62  The following portions of the patient's history were reviewed and updated as appropriate: allergies, current medications, past family history, past medical history, past social history, past surgical history and problem list and review of systems           Objective:    Blood pressure 127/81, pulse 83, temperature 97 9 °F (36 6 °C), height 5' 10" (1 778 m), weight 61 2 kg (135 lb)  Physical Exam  Constitutional:       General: He is awake  Eyes:      General: Lids are normal       Extraocular Movements: Extraocular movements intact  Pupils: Pupils are equal, round, and reactive to light  Neurological:      Mental Status: He is alert  Coordination: Romberg sign negative  Deep Tendon Reflexes: Strength normal       Reflex Scores:       Bicep reflexes are 2+ on the right side and 2+ on the left side  Brachioradialis reflexes are 2+ on the right side and 2+ on the left side  Patellar reflexes are 2+ on the right side and 2+ on the left side  Psychiatric:         Speech: Speech normal          Neurological Exam  Mental Status  Awake, alert and drowsy  Speech is normal  Language is fluent with no aphasia  Attention and concentration are normal  Fund of knowledge is appropriate for level of education  Cranial Nerves  CN II: Visual fields full to confrontation  CN III, IV, VI: Extraocular movements intact bilaterally  Normal lids and orbits bilaterally  Pupils equal round and reactive to light bilaterally  CN VII: Full and symmetric facial movement  CN VIII: Hearing is normal   CN IX, X: Palate elevates symmetrically  CN XI: Shoulder shrug strength is normal   CN XII: Tongue midline without atrophy or fasciculations  Motor   Strength is 5/5 throughout all four extremities  Sensory  Vibration is normal in upper and lower extremities       Reflexes                                            Right                      Left  Brachioradialis                    2+                         2+  Biceps                                 2+                         2+  Patellar                                2+                         2+    Coordination  Right: Finger-to-nose normal  Rapid alternating movement normal Left: Finger-to-nose normal  Rapid alternating movement normal     Gait  Casual gait is normal including stance, stride, and arm swing  Normal tandem gait  Romberg is absent  Able to rise from chair without using arms  ROS:    Review of Systems   Constitutional: Negative  Negative for appetite change and fever  HENT: Negative  Negative for hearing loss, tinnitus, trouble swallowing and voice change  Eyes: Negative  Negative for photophobia, pain and visual disturbance  Respiratory: Negative  Negative for shortness of breath  Cardiovascular: Negative  Negative for palpitations  Gastrointestinal: Negative  Negative for nausea and vomiting  Endocrine: Negative  Negative for cold intolerance  Genitourinary: Negative  Negative for dysuria, frequency and urgency  Musculoskeletal: Negative  Negative for gait problem, myalgias and neck pain  Skin: Negative  Negative for rash  Allergic/Immunologic: Negative  Neurological: Negative  Negative for dizziness, tremors, seizures, syncope, facial asymmetry, speech difficulty, weakness, light-headedness, numbness and headaches  Hematological: Negative  Does not bruise/bleed easily  Psychiatric/Behavioral: Negative  Negative for confusion, hallucinations and sleep disturbance  All other systems reviewed and are negative        No issues to address

## 2022-11-16 ENCOUNTER — TELEMEDICINE (OUTPATIENT)
Dept: PSYCHIATRY | Facility: CLINIC | Age: 32
End: 2022-11-16

## 2022-11-16 DIAGNOSIS — F32.A DEPRESSION, UNSPECIFIED DEPRESSION TYPE: Primary | ICD-10-CM

## 2022-11-16 NOTE — PSYCH
Assessment/Plan:      There are no diagnoses linked to this encounter  Subjective: I got an appointment because I was having difficulties paying attention and feeling like I couldn't concentrate  They told me I was severely depressed  Patient ID: Jennifer Brown is a 28 y o  male  HPI:     Pre-morbid level of function and History of Present Illness: Depression  Previous Psychiatric/psychological treatment/year: MS  Current Psychiatrist/Therapist: Fabiola Crockett  Outpatient and/or Partial and Other Community Resources Used (CTT, ICM, VNA): Outpatient Virtual Therapy      Problem Assessment:     SOCIAL/VOCATION:  Family Constellation (include parents, relationship with each and pertinent Psych/Medical History):     Family History   Problem Relation Age of Onset   • Cancer Father    • Multiple sclerosis Maternal Aunt        Mother: Babita Edmond, 67 y/o, I love her because she is my mother  Distant relationship  Spouse: Butch Shah, 46 y/o, -- Have being together  Great relationship  Father: Had a great connection and relationship  He passed away 9 years ago  Children: N/A   Sibling: N/A   Sibling: N/A  Children: N/A   Other: My 's family  They have been a great support system (even more than my mother)  Peng Santoyo relates best to Butch Shah ()  he lives with Butch Shah 2 cats  he does not live alone  Domestic Violence: No past history of domestic violence    Additional Comments related to family/relationships/peer support:       School or Work History (strengths/limitations/needs): I work in [employer]  I hate it but I am really good at it  I don't think of myself as depressive  I am not the typical   I have never had expectations  I have never had suicidal thoughts  My  told you have lost interest in things  I have lost all of my libido (3 years)  My mom sent some messages to my  telling him that he needed to get mental health       Her highest grade level achieved was [employer] and semester of collage   history includes N/A (all my family does)    Financial status includes- We get by  LEISURE ASSESSMENT (Include past and present hobbies/interests and level of involvement (Ex: Group/Club Affiliations): I don't have hobbies  I loved music  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 Yes     FUNCTIONAL STATUS: There has been a recent change in Artur Cortés ability to do the following: does not need [employer] service    Level of Assistance Needed/By Whom?: Low    Artur Cortés learns best by  demonstration    SUBSTANCE ABUSE ASSESSMENT: no substance abuse    Substance/Route/Age/Amount/Frequency/Last Use: Beer/oral/32/every day/1-2 beers/Monday    DETOX HISTORY: N/A    Previous detox/rehab treatment: N/A    HEALTH ASSESSMENT: no referral to PCP needed    LEGAL: No Mental Health Advance Directive or Power of  on file    Prenatal History: High risk due to age  Delivery History: born by  section    Developmental Milestones: N/A  Temperament as an infant was N/A  Temperament as a toddler was N/A  Temperament at school age was I do not remember anything from elementary and middle school  I have 2 friends      Temperament as a teenager was normal     Risk Assessment:   The following ratings are based on my review of records    Risk of Harm to Self:   Demographic risk factors include   Historical Risk Factors include N/A  Recent Specific Risk Factors include chronic pain or health problems  Additional Factors for a Child or Adolescent N/A    Risk of Harm to Others:   Demographic Risk Factors include male  Historical Risk Factors include N/A  Recent Specific Risk Factors include multiple stressors    Access to Weapons:   Vignesh Hilton has access to the following weapons: No weapons  The following steps have been taken to ensure weapons are properly secured: No weapons reported    Based on the above information, the client presents the following risk of harm to self or others:  low    The following interventions are recommended:   no intervention changes    Notes regarding this Risk Assessment: Alma López reported no suicidal thoughts, ideation nor plans to hurt himself or someone else at the time of this assessment  Review Of Systems:     Mood Normal Stress because of work   Behavior Normal    Thought Content Normal   General Relationship Problems lack of intimacy    Sleep issues   Personality Normal   Other Psych Symptoms Normal   Constitutional Normal   ENT Normal   Cardiovascular Normal    Respiratory Normal    Gastrointestinal Normal   Genitourinary Normal    Musculoskeletal Negative   Integumentary Normal    Neurological Headache due to MS   Endocrine Normal          Mental status:  Appearance calm and cooperative , adequate hygiene and grooming and good eye contact    Mood mood appropriate   Affect affect appropriate    Speech a normal rate and fluent   Thought Processes coherent/organized and normal thought processes   Hallucinations no hallucinations present    Thought Content no delusions   Abnormal Thoughts no suicidal thoughts  and no homicidal thoughts    Orientation  oriented to person and place and time   Remote Memory short term memory impaired and long term memory impaired   Attention Span concentration impaired   Intellect Appears to be of Average Intelligence   Fund of Knowledge displays adequate knowledge of current events, adequate fund of knowledge regarding past history and adequate fund of knowledge regarding vocabulary    Insight Insight intact   Judgement judgment was intact   Muscle Strength Muscle strength and tone were normal and Normal gait    Language no difficulty naming common objects, no difficulty repeating a phrase  and no difficulty writing a sentence    Pain none   Pain Scale 0     Visit start and stop times:    11/16/22

## 2022-11-30 ENCOUNTER — TELEMEDICINE (OUTPATIENT)
Dept: PSYCHIATRY | Facility: CLINIC | Age: 32
End: 2022-11-30

## 2022-11-30 DIAGNOSIS — F32.A DEPRESSION, UNSPECIFIED DEPRESSION TYPE: Primary | ICD-10-CM

## 2022-11-30 NOTE — BH TREATMENT PLAN
Isaíasncrui Pennington  1990       Date of Initial Treatment Plan: 11/30/22   Date of Current Treatment Plan: 11/30/22    Treatment Plan Number 1     Strengths/Personal Resources for Self Care: I try to finish thing, I don't leave things undone  I am detail oriented, I am easy to get along with  I am reliable  I try to be empathetic even if it forced  I care  Diagnosis:   1  Depression, unspecified depression type            Area of Needs: I need to recognize others' feelings  I do not  on social cues unless I am told  I usually say say things that are taken the wrong way  I am very sarcastic  Long Term Goal 1: I would like to see an improvement with personal communication and reading social cues  Target Date: 05/30/23  Completion Date: N/A         Short Term Objectives for Goal 1: Bhumika Garner will learn, practice and implement assertive communiation when interacting with family  Long Term Goal 2: N/A    Target Date: N/A  Completion Date: N/A    Short Term Objectives for Goal 2: N/A         Long Term Goal # 3: N/A     Target Date: N/A  Completion Date: N/A    Short Term Objectives for Goal 3: N/A    GOAL 1: Modality: Individual 2x per month   Completion Date N/A and The person(s) responsible for carrying out the plan is  [facility]    GOAL 2: Modality: Individual 2x per month   Completion Date N/A and The person(s) responsible for carrying out the plan is  Eduardo GARBER  [facility]     GOAL 3: Modality: N/A       Behavioral Health Treatment Plan  Luke: Diagnosis and Treatment Plan explained to Marichuy Tijerina relates understanding diagnosis and is agreeable to Treatment Plan  Client Comments : Please share your thoughts, feelings, need and/or experiences regarding your treatment plan: "I feel good about it, I know that is something I need to work on"  2:22 pm    This treatment plan was created between this clinician and [facility]  Lissette;1990 on 11/30/22 @ 2:22  This treatment plan was created during a Virtual Visit using the [address])  Laney Mclaughlin provided verbal consent at the time of the actual session

## 2022-11-30 NOTE — PSYCH
DATA: Met with Gaby Antoine for scheduled individual session  Topics of discussion included family stressors  Client shows evidence of utilizing Mindfulness-based strategies and distress tolerance skills skills to manage mental health symptoms  During this session, this clinician used the following therapeutic modalities: Motivational Interviewing  Clinician provided psychoeducation regarding use of assertive communication       ASSESSMENT: Gaby Antoine presents with a normal mood  His affect is normal range and intensity, appropriate  Gaby Antoine exhibits early engagement with this clinician  Gaby Antoine continues to exhibit willingness to work on treatment goals and objectives  Gaby Antoine presents with a minimal risk of suicide, minimal risk of self-harm, and minimal risk of harm to others  PLAN: Gaby Antoine will return in 3 weeks for the next scheduled session  Between sessions, Gaby Antoine will practice assertive communication and will report back during the next session re: successes and barriers  At the next session, this clinician will use Motivational Interviewing to address depression, in an effort to [id number] [place] [address] with meeting treatment goals  Virtual Regular Visit    Verification of patient location:    Patient is located in the following [facility] in which I hold an active license [place]      Assessment/Plan:    Problem List Items Addressed This Visit        Other    Depression - Primary       Goals addressed in session: Goal 1          Reason for visit is No chief complaint on file  Encounter provider Rasta Charles LCSW    Provider located at [address] [address] [phone]      Recent Visits  No visits were found meeting these conditions    Showing recent visits within past 7 days and meeting all other requirements  Today's Visits  Date Type Provider Dept   11/30/22 Telemedicine Julia Crockett LCSW [facility]   Showing today's visits and meeting all other requirements  Future Appointments  No visits were found meeting these conditions  Showing future appointments within next 150 days and meeting all other requirements       The patient was identified by name and date of birth  Toro Mark was informed that this is a telemedicine visit and that the visit is being conducted [employer]  He agrees to proceed  No one else was in the room  He acknowledged consent and understanding of privacy and security of the video platform  The patient has agreed to participate and understands they can discontinue the visit at any time  Patient is aware this is a billable service  Subjective  Toro Mark is a 28 y o  male    HPI     Past Medical History:   Diagnosis Date   • Multiple sclerosis ([facility] )        Past Surgical History:   Procedure Laterality Date   • WISDOM TOOTH EXTRACTION         Current Outpatient Medications   Medication Sig Dispense Refill   • cholecalciferol (VITAMIN D3) 1,000 units tablet Take 1,000 Units by mouth daily     • Multiple Vitamins-Minerals (multivitamin with minerals) tablet Take 1 tablet by mouth daily       • Teriflunomide (Aubagio) 14 MG TABS Take 1 tablet (14 mg total) by mouth in the morning 30 tablet 11     No current facility-administered medications for this visit  No Known Allergies    Review of Systems    Video Exam    There were no vitals filed for this visit      Physical Exam     Visit Time    Visit Start Time: 2:00  Visit Stop Time: 2:43  Total Visit Duration: 43 minutes

## 2022-12-12 ENCOUNTER — TELEMEDICINE (OUTPATIENT)
Dept: PSYCHIATRY | Facility: CLINIC | Age: 32
End: 2022-12-12

## 2022-12-12 DIAGNOSIS — F32.A DEPRESSION, UNSPECIFIED DEPRESSION TYPE: Primary | ICD-10-CM

## 2022-12-13 NOTE — PSYCH
Virtual Regular Visit    Verification of patient location:    Patient is located in the following state in which I hold an active license [place]      Assessment/Plan:    Problem List Items Addressed This Visit        Other    Depression - Primary       Goals addressed in session: Goal 1          Reason for visit is No chief complaint on file  Encounter provider Ellen Ward LCSW    Provider located at [address] [id number] [phone]      Recent Visits  No visits were found meeting these conditions  Showing recent visits within past 7 days and meeting all other requirements  Today's Visits  Date Type Provider Dept   12/12/22 Telemedicine Luis Crockett LCSW [facility]   Showing today's visits and meeting all other requirements  Future Appointments  No visits were found meeting these conditions  Showing future appointments within next 150 days and meeting all other requirements       The patient was identified by name and date of birth  Annelise Gregory was informed that this is a telemedicine visit and that the visit is being conducted throughthe [employer] platform  He agrees to proceed     My office door was closed  No one else was in the room  He acknowledged consent and understanding of privacy and security of the video platform  The patient has agreed to participate and understands they can discontinue the visit at any time  Patient is aware this is a billable service  Subjective  Annelise Gregory is a 28 y o  male          HPI     Past Medical History:   Diagnosis Date   • Multiple sclerosis ([facility] [address] )        Past Surgical History:   Procedure Laterality Date   • WISDOM TOOTH EXTRACTION         Current Outpatient Medications   Medication Sig Dispense Refill   • cholecalciferol (VITAMIN D3) 1,000 units tablet Take 1,000 Units by mouth daily     • Multiple Vitamins-Minerals (multivitamin with minerals) tablet Take 1 tablet by mouth daily       • Teriflunomide (Aubagio) 14 MG TABS Take 1 tablet (14 mg total) by mouth in the morning 30 tablet 11     No current facility-administered medications for this visit  No Known Allergies    Review of Systems    Video Exam    There were no vitals filed for this visit  Physical Exam     Visit Time    Visit Start Time:5:00  Visit Stop Time: 5:47  Total Visit Duration: 47 minutes     DATA: Met with Britany Jeffries for scheduled individual session  Topics of discussion included trauma history, relationships with friends, mood regulation and symptoms and grief and loss  Client shows evidence of utilizing CBT thought record and distress tolerance skills skills to manage mental health symptoms  During this session, this clinician used the following therapeutic modalities: supportive psychotherapy and Motivational Interviewing  Clinician provided psychoeducation regarding use of identifying cognitive distortions and thought log  Issac Olvera reported no finding things that he likes about himself, struggling to interact with others, and not accepting complements  He was able to identify several cognitive distortions in his thought process "jumping into conclusions and disqualifying the positive"  He also talked about the death of his father and his illness  ASSESSMENT: Britany Jeffries presents with a normal mood  His affect is normal range and intensity, appropriate  Britany Jeffries exhibits early engagement with this clinician  Britany Jeffries continues to exhibit willingness to work on treatment goals and objectives  Britany Jeffries presents with a minimal risk of suicide, minimal risk of self-harm, and minimal risk of harm to others  PLAN: Britany Jeffries will return in 2 weeks for the next scheduled session  Between sessions, Britany Jeffries will practice CBT model and will report back during the next session re: successes and barriers   At the next session, this clinician will use CBT techniques to address depression, in an effort to assist Yoko with meeting treatment goals

## 2022-12-28 ENCOUNTER — TELEPHONE (OUTPATIENT)
Dept: PSYCHIATRY | Facility: CLINIC | Age: 32
End: 2022-12-28

## 2022-12-28 NOTE — TELEPHONE ENCOUNTER
Pt called in regards to reschedule an appt today at 6:00 p m  Writer informed pt that he has to call [facility] and proceeded to give him the proper phone number

## 2023-01-17 ENCOUNTER — TELEMEDICINE (OUTPATIENT)
Dept: PSYCHIATRY | Facility: CLINIC | Age: 33
End: 2023-01-17

## 2023-01-17 DIAGNOSIS — F32.A DEPRESSION, UNSPECIFIED DEPRESSION TYPE: Primary | ICD-10-CM

## 2023-01-18 NOTE — PSYCH
Virtual Regular Visit    Verification of patient location:    Patient is located in the following state in which I hold an active license [place]      Assessment/Plan:    Problem List Items Addressed This Visit        Other    Depression - Primary       Goals addressed in session: Goal 1 and Goal 2          Reason for visit is No chief complaint on file  Encounter provider Jase Reyes LCSW    Provider located at [address] [place] [id number]  [phone]      Recent Visits  Date Type Provider Dept   01/17/23 Telemedicine Monica Crockett LCSW [facility]   Showing recent visits within past 7 days and meeting all other requirements  Future Appointments  No visits were found meeting these conditions  Showing future appointments within next 150 days and meeting all other requirements       The patient was identified by name and date of birth  Nuria Shelley was informed that this is a telemedicine visit and that the visit is being conducted [employer]  He agrees to proceed     My office door was closed  No one else was in the room  He acknowledged consent and understanding of privacy and security of the video platform  The patient has agreed to participate and understands they can discontinue the visit at any time  Patient is aware this is a billable service  Subjective  Nuria Shelley is a 28 y o  male          HPI     Past Medical History:   Diagnosis Date   • Multiple sclerosis ([facility] [address] )        Past Surgical History:   Procedure Laterality Date   • WISDOM TOOTH EXTRACTION         Current Outpatient Medications   Medication Sig Dispense Refill   • cholecalciferol (VITAMIN D3) 1,000 units tablet Take 1,000 Units by mouth daily     • Multiple Vitamins-Minerals (multivitamin with minerals) tablet Take 1 tablet by mouth daily       • Teriflunomide (Aubagio) 14 MG TABS Take 1 tablet (14 mg total) by mouth in the morning 30 tablet 11     No current facility-administered medications for this visit  No Known Allergies    Review of Systems    Video Exam    There were no vitals filed for this visit  Physical Exam     Visit Time    Visit Start Time: 8:00  Visit Stop Time: 8:47  Total Visit Duration: 47 minutes     Behavioral Health Psychotherapy Progress Note    Psychotherapy Provided: Individual Psychotherapy     1  Depression, unspecified depression type            Goals addressed in session: Goal 1 and Goal 2     DATA: Maria De Jesus Corbin was engaged and cooperative during session  He talked about his relationship with his body and self-image  He reported concerns about his libido and how this was negatively impacting his relationship with his   During this session, this clinician used the following therapeutic modalities: Cognitive Behavioral Therapy    Substance Abuse was not addressed during this session  If the client is diagnosed with a co-occurring substance use disorder, please indicate any changes in the frequency or amount of use: N/A  Stage of change for addressing substance use diagnoses: No substance use/Not applicable    ASSESSMENT:  Anaya Davies presents with a Euthymic/ normal and Depressed mood  his affect is Normal range and intensity, which is congruent, with his mood and the content of the session  The client has made progress on their goals  Anaya Davies presents with a none risk of suicide, none risk of self-harm, and none risk of harm to others  For any risk assessment that surpasses a "low" rating, a safety plan must be developed  A safety plan was indicated: no  If yes, describe in detail N/A    PLAN: Between sessions, Anaya Davies will practice identifying cognitive distortions   At the next session, the therapist will use Cognitive Behavioral Therapy and Cognitive Processing Therapy to address symptoms of depression  Behavioral Health Treatment Plan and Discharge Planning: Theharpreet Davies is aware of and agrees to continue to work on their treatment plan  They have identified and are working toward their discharge goals   no    Visit start and stop times:    01/18/23  Start Time: 0800  Stop Time: 0847  Total Visit Time: 47 minutes

## 2023-02-07 ENCOUNTER — TELEMEDICINE (OUTPATIENT)
Dept: PSYCHIATRY | Facility: CLINIC | Age: 33
End: 2023-02-07

## 2023-02-07 DIAGNOSIS — F32.A DEPRESSION, UNSPECIFIED DEPRESSION TYPE: Primary | ICD-10-CM

## 2023-02-08 NOTE — PSYCH
Virtual Regular Visit    Verification of patient location:    Patient is located in the following state in which I hold an active license [place]      Assessment/Plan:    Problem List Items Addressed This Visit        Other    Depression - Primary       Goals addressed in session: Goal 1 and Goal 2          Reason for visit is No chief complaint on file  Encounter provider Joshua Hernandez LCSW    Provider located at [address] [place] [id number] [phone]      Recent Visits  No visits were found meeting these conditions  Showing recent visits within past 7 days and meeting all other requirements  Today's Visits  Date Type Provider Dept   02/07/23 Telemedicine Jasmin Crockett LCSW [facility]   Showing today's visits and meeting all other requirements  Future Appointments  No visits were found meeting these conditions  Showing future appointments within next 150 days and meeting all other requirements       The patient was identified by name and date of birth  Irene Santana was informed that this is a telemedicine visit and that the visit is being conducted throughthe [employer] platform  He agrees to proceed     My office door was closed  No one else was in the room  He acknowledged consent and understanding of privacy and security of the video platform  The patient has agreed to participate and understands they can discontinue the visit at any time  Patient is aware this is a billable service  Subjective  Irene Santnaa is a 28 y o  male        HPI     Past Medical History:   Diagnosis Date   • Multiple sclerosis ([facility] [address] )        Past Surgical History:   Procedure Laterality Date   • WISDOM TOOTH EXTRACTION         Current Outpatient Medications   Medication Sig Dispense Refill   • cholecalciferol (VITAMIN D3) 1,000 units tablet Take 1,000 Units by mouth daily • Multiple Vitamins-Minerals (multivitamin with minerals) tablet Take 1 tablet by mouth daily       • Teriflunomide (Aubagio) 14 MG TABS Take 1 tablet (14 mg total) by mouth in the morning 30 tablet 11     No current facility-administered medications for this visit  No Known Allergies    Review of Systems    Video Exam    There were no vitals filed for this visit  Physical Exam     Behavioral Health Psychotherapy Progress Note    Psychotherapy Provided: Individual Psychotherapy     1  Depression, unspecified depression type            Goals addressed in session: Goal 1     DATA: Met with Kelley Woodward for scheduled individual session  Topics of discussion included relationship with significant other and trauma history  Client shows evidence of utilizing CBT thought record skills to manage mental health symptoms  During this session, this clinician used the following therapeutic modalities: supportive psychotherapy and CBT techniques  Substance Abuse was not addressed during this session  If the client is diagnosed with a co-occurring substance use disorder, please indicate any changes in the frequency or amount of use: n/a  Stage of change for addressing substance use diagnoses: No substance use/Not applicable    ASSESSMENT:  Hubert Grant presents with a Euthymic/ normal mood  his affect is Normal range and intensity, which is congruent, with his mood and the content of the session  The client has made progress on their goals  Hubert Grant presents with a none risk of suicide, none risk of self-harm, and none risk of harm to others  For any risk assessment that surpasses a "low" rating, a safety plan must be developed  A safety plan was indicated: no  If yes, describe in detail n/a    PLAN: Between sessions, Hubert Grant will practice CBT techniques to   At the next session, the therapist will use Cognitive Behavioral Therapy to address depression      Behavioral Health Treatment Plan and Discharge Planning: Domenica Chamberlain is aware of and agrees to continue to work on their treatment plan  They have identified and are working toward their discharge goals   no    Visit start and stop times:    02/07/23  Start Time: 0800  Stop Time: 1562  Total Visit Time: 47 minutes

## 2023-02-27 ENCOUNTER — TELEPHONE (OUTPATIENT)
Dept: OTHER | Facility: OTHER | Age: 33
End: 2023-02-27

## 2023-03-20 NOTE — PROGRESS NOTES
Patient ID: Zana Mendez is a 32 y o  male  Assessment/Plan:    [facility]  32year old male with MS diagnosed in 2016, started on Aubagio at time of diagnosis, and then was lost to follow up  He stopped Aubagio on his own in Dec 2018  He had a prolonged absence from our office between May 2017 and January 2022 when he presented to re-establish care  Imaging was recently obtained which showed significant disease progression compared to last imaging in 2016  He had at least 10 new, enhancing lesions in the brain and multiple new cervical cord lesions  MRI t-spine is pending, scheduled  He received 3 days of IV steroids 3/2-3/4, tolerated well  He is ready to resume DMT, which we discussed is absolutely advised due to active disease on recent MRIs  He overall tolerated Aubagio well while on it, just noted some fatigue, but unsure if related to the medication, because he still has that despite being off med for several years  He was previously JCV neg, now JCV positive  We discussed he would not be an ideal candidate for Tysabri  Besides Aubagio, options discussed included Tecfidera, Vumerity, B-cell depleting therapies such as Ocrevus or Delene Los  He is concerned about immunosuppression with B-cell depleting therapies and would like to avoid for now  He would rather take a once daily med such as Aubagio vs BID dosing with Tecfidera and Vumerity  Reviewed side effects of Aubagio  He has no plans for fathering children at this time, explained teratogenicity potential with Aubagio  He is aware of long half life of the med  He is aware he will need blood work done monthly for the first 6 months on drug  Start form signed for Aubagio today  Standing order for monthly CBC and hepatic function panel entered  He will return in 3-4 months or sooner if needed  He was advised to call for any new or worsening symptoms in the meantime         Diagnoses and all orders for this visit:    Multiple sclerosis ([facility] [address] )  -     CBC and differential; Standing  -     Hepatic function panel; Standing  -     CBC and differential  -     Hepatic function panel    Vitamin D deficiency           Subjective:    HPI    Ivett Zambrano is a 32 y o  male with PMH of MS, JOSE who presents today for follow up  Patient was previously followed by our office for his MS, but had a prolonged absence from our office between May 2017 and January 2022 when he presented to re-establish care  To review, he was initially seen in Sept 2016 for eval of newly diagnosed MS  Patient had symptoms starting in April 2016 with some issues with his speech  He notes he would stop in the middle of the sentence, or have slurring of his speech  PCP ordered MRI and labs  Patient notes first MRI was done without contrast, and he was brought back in for contrasted study  Patient notes he was told by PCP that he likely had MS and was sent to [address] [place] [address] neurology  He was seen in May 2016 by [facility] neurology and had additional studies including MRI c-spine and t-spine  Patient notes he was told he was going to start Tysabri, but no discussion of the med  When he researched the med on his own, he had concerns about taking the med as his first line therapy  MRI brain 4/2016 no acute infarct  Multiple primarily sub centimeter FLAIR hyperintense lesions present within the subcortical WM, juxtacortical location and deep PV white matter  Additionally a single small lesion present within the splenium of the corpus callosum  At least one of the lesions has a somewhat Roberts finger-like morphology  The totally number of lesions is approximately 15 with the largest at 12mm within the subcortical WM of the left parietal lobe  Although non-specific, given the number and location of the lesions, most concerning for demyelinating process   Patient went back with contrast and there were several areas of enhancement notes within the subcortical WM of the frontal lobes bilaterally, left parietal, and madeline temporal lobes  MRI t-spine done 6/3/16 no cord lesions  MRI c-spine 6/3/16 no clear evidence of focal cord demyelination, mild DDD, no spinal stenosis  Question of artifact on sagittal films  JCV negative with index of 0 17, done on Oct 17, 2016  Patient started Maryjean Luevano as his first medication in late 2016  Patient returned to our office after 5 years absence  He reports he stopped Aubagio in December 2018  He felt he was doing well and did not feel he needed medication or follow up  He reported in the months leading up to returning to our office, he did notice some new symptoms including right-sided weakness, right hand tingling, occasional blurry vision  Following his last visit, he had updated imaging completed  MRI brain 2/24/2022 compared to 4/2016  Significant disease progression noted including at least 10 enhancing supratentorial lesions  MRI c-spine 2/24/2022 compared to 6/2016  Multiple white matter lesions scattered throughout cervical spinal cord and possibly upper thoracic cord, new since prior exam   No enhancing cord lesions  Patient was called with these results and IV steroids were offered  He completed 3 days of IV steroids 3/2, 3/3 and 3/4, overall tolerated well  Labs completed 2/19/22  Vit D low at 16  Neg Lyme, Sjogren's, Quantiferon Gold TB   JCV now positive with index 0 87  Normal CBC and CMP  Today, patient presents with his partner  He reports he is overall doing well  He is ready to resume DMT due to disease progression on MRIs    He is taking Rx vit D, which was called in after his blood work results came in     The following portions of the patient's history were reviewed and updated as appropriate: current medications, past family history, past medical history, past social history, past surgical history and problem list          Objective:    Blood pressure 116/65, pulse 73, temperature (!) 97 °F (36 1 °C), temperature source Tympanic, resp  rate 18, height 5' 10" (1 778 m), weight 63 9 kg (140 lb 12 8 oz)  Physical Exam  Constitutional:       Appearance: Normal appearance  HENT:      Head: Normocephalic and atraumatic  Eyes:      Extraocular Movements: EOM normal       Pupils: Pupils are equal, round, and reactive to light  Neurological:      Mental Status: He is alert  Deep Tendon Reflexes: Strength normal and reflexes are normal and symmetric  Psychiatric:         Mood and Affect: Mood normal          Speech: Speech normal          Behavior: Behavior normal          Neurological Exam  Mental Status  Alert  Oriented to person, place, time and situation  Speech is normal  Language is fluent with no aphasia  Attention and concentration are normal     Cranial Nerves  CN II: Visual fields full to confrontation  CN III, IV, VI: Extraocular movements intact bilaterally  Pupils equal round and reactive to light bilaterally  CN V: Facial sensation is normal   CN VII: Full and symmetric facial movement  CN VIII: Hearing is normal   CN IX, X: Palate elevates symmetrically  CN XI: Shoulder shrug strength is normal   CN XII: Tongue midline without atrophy or fasciculations  Motor   Normal muscle tone  Strength is 5/5 throughout all four extremities  Sensory  Sensation is intact to light touch, pinprick, vibration and proprioception in all four extremities  Reflexes  Deep tendon reflexes are 2+ and symmetric in all four extremities with downgoing toes bilaterally  Coordination  Right: Finger-to-nose normal   Left: Finger-to-nose normal   Very slightly slower rapid alternating movements on the right side of his body compared to the left  Gait  Casual gait is normal including stance, stride, and arm swing  ROS:    Review of Systems   Constitutional: Negative for chills and fever  HENT: Negative for ear pain and sore throat  Eyes: Negative for pain and visual disturbance  Respiratory: Negative for cough and shortness of breath  Cardiovascular: Negative for chest pain and palpitations  Gastrointestinal: Negative for abdominal pain and vomiting  Genitourinary: Negative for dysuria and hematuria  Musculoskeletal: Positive for back pain, neck pain and neck stiffness  Negative for arthralgias  Skin: Negative for color change and rash  Neurological: Positive for headaches (everyday pain of 2)  Negative for seizures and syncope  Psychiatric/Behavioral: Positive for sleep disturbance (trouble sleeping well)  All other systems reviewed and are negative      I personally reviewed and updated the ROS as appropriate 64

## 2023-04-18 ENCOUNTER — TELEPHONE (OUTPATIENT)
Dept: NEUROLOGY | Facility: CLINIC | Age: 33
End: 2023-04-18

## 2023-04-18 DIAGNOSIS — G35 MULTIPLE SCLEROSIS (HCC): Primary | ICD-10-CM

## 2023-04-18 NOTE — TELEPHONE ENCOUNTER
Would see if he can correlate the dizziness to anything such as position changes, dehydration etc   Is it dizzy/lightheaded, or vertigo like room is spinning? Is he getting good sleep? Does this happen more if he is tired? Just trying to see if any triggers for this  I entered MRI brain  Symptoms have been going on for a month per note, so non-urgent MRI, but would be great to get prior to follow up visit      Call if any new/worsening symptoms

## 2023-04-18 NOTE — TELEPHONE ENCOUNTER
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   "

## 2023-04-19 NOTE — TELEPHONE ENCOUNTER
Spoke with pt's SO Boris and advised him of Melisa's response and recommendations  He says position changes don't seem to matter  Says pt wakes up with dizziness  Says he feels pt is not dehydrated  States dizziness more like room is spinning, comes on very sudden like no reason for it whatsoever  Pt's SO states that last year, around this time, pt had to go for infusions due to flare up  Provided telephone number to CS  Shyann Burciaga states they will be sure to schedule before next OV

## 2023-04-28 ENCOUNTER — HOSPITAL ENCOUNTER (OUTPATIENT)
Dept: MRI IMAGING | Facility: HOSPITAL | Age: 33
Discharge: HOME | End: 2023-04-28

## 2023-04-28 DIAGNOSIS — G35 MULTIPLE SCLEROSIS (HCC): ICD-10-CM

## 2023-04-28 RX ADMIN — GADOBUTROL 6 ML: 604.72 INJECTION INTRAVENOUS at 19:46

## 2023-05-06 ENCOUNTER — APPOINTMENT (OUTPATIENT)
Dept: LAB | Facility: MEDICAL CENTER | Age: 33
End: 2023-05-06

## 2023-05-06 DIAGNOSIS — G35 MULTIPLE SCLEROSIS (HCC): ICD-10-CM

## 2023-05-06 LAB
ALBUMIN SERPL BCP-MCNC: 4.4 G/DL (ref 3.5–5)
ALP SERPL-CCNC: 64 U/L (ref 46–116)
ALT SERPL W P-5'-P-CCNC: 24 U/L (ref 12–78)
AST SERPL W P-5'-P-CCNC: 15 U/L (ref 5–45)
BASOPHILS # BLD AUTO: 0.04 THOUSANDS/ÂΜL (ref 0–0.1)
BASOPHILS NFR BLD AUTO: 1 % (ref 0–1)
BILIRUB DIRECT SERPL-MCNC: 0.14 MG/DL (ref 0–0.2)
BILIRUB SERPL-MCNC: 0.72 MG/DL (ref 0.2–1)
EOSINOPHIL # BLD AUTO: 0.05 THOUSAND/ÂΜL (ref 0–0.61)
EOSINOPHIL NFR BLD AUTO: 1 % (ref 0–6)
ERYTHROCYTE [DISTWIDTH] IN BLOOD BY AUTOMATED COUNT: 12.3 % (ref 11.6–15.1)
HCT VFR BLD AUTO: 49.2 % (ref 36.5–49.3)
HGB BLD-MCNC: 16.2 G/DL (ref 12–17)
IMM GRANULOCYTES # BLD AUTO: 0.01 THOUSAND/UL (ref 0–0.2)
IMM GRANULOCYTES NFR BLD AUTO: 0 % (ref 0–2)
LYMPHOCYTES # BLD AUTO: 1.72 THOUSANDS/ÂΜL (ref 0.6–4.47)
LYMPHOCYTES NFR BLD AUTO: 42 % (ref 14–44)
MCH RBC QN AUTO: 29.7 PG (ref 26.8–34.3)
MCHC RBC AUTO-ENTMCNC: 32.9 G/DL (ref 31.4–37.4)
MCV RBC AUTO: 90 FL (ref 82–98)
MONOCYTES # BLD AUTO: 0.42 THOUSAND/ÂΜL (ref 0.17–1.22)
MONOCYTES NFR BLD AUTO: 10 % (ref 4–12)
NEUTROPHILS # BLD AUTO: 1.9 THOUSANDS/ÂΜL (ref 1.85–7.62)
NEUTS SEG NFR BLD AUTO: 46 % (ref 43–75)
NRBC BLD AUTO-RTO: 0 /100 WBCS
PLATELET # BLD AUTO: 221 THOUSANDS/UL (ref 149–390)
PMV BLD AUTO: 10.4 FL (ref 8.9–12.7)
PROT SERPL-MCNC: 7.6 G/DL (ref 6.4–8.4)
RBC # BLD AUTO: 5.46 MILLION/UL (ref 3.88–5.62)
WBC # BLD AUTO: 4.14 THOUSAND/UL (ref 4.31–10.16)

## 2023-05-07 DIAGNOSIS — G35 MULTIPLE SCLEROSIS (HCC): Primary | ICD-10-CM

## 2023-05-08 ENCOUNTER — TELEPHONE (OUTPATIENT)
Dept: NEUROLOGY | Facility: CLINIC | Age: 33
End: 2023-05-08

## 2023-05-08 NOTE — TELEPHONE ENCOUNTER
----- Message from Tyshawn Pineda PA-C sent at 5/3/2023  8:03 AM EDT -----  Please let patient know there has been slight disease progression in the brain since his last MRI  No enhancement to indicate any active demyelination  I would recommend keeping a close eye and repeating MRI in 6 months  If there is any disease progression on that study, need to consider change in DMT

## 2023-05-08 NOTE — TELEPHONE ENCOUNTER
Spoke w/pt  Advised him of results and recommendations below  Pt verbalized understanding  He is agreeable to plan  Confirmed 5/15/23 appt w/Dr Sanchez Leal

## 2023-05-09 ENCOUNTER — TELEPHONE (OUTPATIENT)
Dept: NEUROLOGY | Facility: CLINIC | Age: 33
End: 2023-05-09

## 2023-05-09 NOTE — TELEPHONE ENCOUNTER
----- Message from Amalia Elder MD sent at 5/7/2023  7:15 AM EDT -----  Let pt know lfts ok including direct bili  This is back to normal   Cbc with slightly low wbc of 4 14   any recent infections? Alc and anc good  Will repeat cbc diff in 2 months  Rx in emr  Also, Remind pt of teratogenicity with aubagio and need to come off med for any plans for future pregnancies even as a male

## 2023-05-15 ENCOUNTER — OFFICE VISIT (OUTPATIENT)
Dept: NEUROLOGY | Facility: CLINIC | Age: 33
End: 2023-05-15

## 2023-05-15 ENCOUNTER — TELEPHONE (OUTPATIENT)
Dept: NEUROLOGY | Facility: CLINIC | Age: 33
End: 2023-05-15

## 2023-05-15 VITALS
DIASTOLIC BLOOD PRESSURE: 66 MMHG | BODY MASS INDEX: 19.33 KG/M2 | HEART RATE: 69 BPM | TEMPERATURE: 97 F | SYSTOLIC BLOOD PRESSURE: 102 MMHG | WEIGHT: 135 LBS | HEIGHT: 70 IN

## 2023-05-15 DIAGNOSIS — R41.3 COMPLAINTS OF MEMORY DISTURBANCE: ICD-10-CM

## 2023-05-15 DIAGNOSIS — G47.00 FREQUENT NOCTURNAL AWAKENING: ICD-10-CM

## 2023-05-15 DIAGNOSIS — G35 MULTIPLE SCLEROSIS (HCC): Primary | ICD-10-CM

## 2023-05-15 DIAGNOSIS — Z51.81 ENCOUNTER FOR MEDICATION MONITORING: ICD-10-CM

## 2023-05-15 DIAGNOSIS — R51.9 HEADACHE: ICD-10-CM

## 2023-05-15 NOTE — PROGRESS NOTES
Patient ID: Ritu Prasad is a 28 y o  male  Assessment/Plan:    Multiple sclerosis ([facility] )  Patient here in follow up for MS diagnosed in 2016  He reports no new symptoms or progression of symptoms since last visit  No recent illnesses, injuries, or falls  No hospitalizations  He is maintained on Aubagio and is tolerating it well  Having issues with obtaining his medication and will have our [address] look into the Rx and pharmacy to ensure that he continues to receive his medication  Continues to complain of brain fog  Had prior neuropsychology testing which revealed normal cognitive functioning with depression overlay  He tried therapy for about 6 sessions and then stopped as he did not feel it was beneficial   Not had any medications for mood  Placed referral to Occupational Therapy for cognitive therapy if patient chooses to pursue this option  Additionally feels tired throughout the day and reports frequent nocturnal awakenings  Will refer to sleep medicine for additional evaluation  He is also overdue for eye exam and referral was placed to ophthalmology, Dr Lu Fallon  Continue on Aubagio  His last labs were notable for slightly lower WBC and ANC, and thus will repeat in 3 months  His most recent MRI from April also showed slight disease progression and thus we will repeat in 6 months  Continue to remain as physically active as possible  Natasha Landerosin in approximately 6 months    Complaints of memory disturbance  Patient had formal neuropsychology testing which was normal   There was concern for possible depression overlay, and patient has completed a course of therapy  We will also refer to occupational therapy for cognitive therapy services  Frequent nocturnal awakening  Reports poor sleep since childhood, now having frequent nighttime awakenings that disrupts his sleep  Does not feel rested during the day    Will refer to sleep medicine for further evaluation    Headache  Several years of pain "behind the right eye  Reports having significant sinus issues on that side, and prior nasal procedure  He will have headaches that last for 30 minutes up to a day or 2 in duration  Recommended following up with his ENT  Also recommended that he be seen by a headache center such as [place]  Diagnoses and all orders for this visit:    Multiple sclerosis [facility])  -     Ambulatory Referral to Sleep Medicine; Future  -     CBC and differential; Future  -     Hepatic function panel; Future  -     Ambulatory Referral to Occupational Therapy; Future  -     Ambulatory Referral to Ophthalmology; Future  -     MRI brain MS wo and w contrast; Future    Frequent nocturnal awakening  -     Ambulatory Referral to Sleep Medicine; Future    Encounter for medication monitoring  -     CBC and differential; Future  -     Hepatic function panel; Future    Complaints of memory disturbance  -     Ambulatory Referral to Occupational Therapy; Future    Headache         Subjective:    HPI    I had the pleasure of seeing your patient, Ananya Schafer, today in the Multiple Sclerosis clinic for routine follow up  He was last seen in clinic on 11/14/22 by Dr Christine Carter and Dr Jj Amaya  The following was copied from prior notes due to complexity: \"To review, he was initially seen in Sept 2016 for eval of newly diagnosed MS  Patient had symptoms starting in April 2016 with some issues with his speech  He notes he would stop in the middle of the sentence, or have slurring of his speech  PCP ordered MRI and labs  Patient notes first MRI was done without contrast, and he was brought back in for contrasted study  Patient notes he was told by PCP that he likely had MS and was sent to [address] [place] [address] neurology  He was seen in May 2016 by [facility] neurology and had additional studies including MRI c-spine and t-spine  Patient notes he was told he was going to start Tysabri, but no discussion of the med   When he researched the med on his own, he had " concerns about taking the med as his first line therapy  MRI brain 4/2016 no acute infarct  Multiple primarily sub centimeter FLAIR hyperintense lesions present within the subcortical WM, juxtacortical location and deep PV white matter  Additionally a single small lesion present within the splenium of the corpus callosum  At least one of the lesions has a somewhat Robert’s finger-like morphology  The totally number of lesions is approximately 15 with the largest at 12mm within the subcortical WM of the left parietal lobe  Although non-specific, given the number and location of the lesions, most concerning for demyelinating process  Patient went back with contrast and there were several areas of enhancement notes within the subcortical WM of the frontal lobes bilaterally, left parietal, and madeline temporal lobes  MRI t-spine done 6/3/16 no cord lesions  MRI c-spine 6/3/16 no clear evidence of focal cord demyelination, mild DDD, no spinal stenosis  Question of artifact on sagittal films  JCV negative with index of 0 17, done on Oct 17, 2016  Patient started Catracho Meals as his first medication in late 2016  Patient returned to our office after 5 years absence  He reported he stopped Aubagio in December 2018  He felt he was doing well and did not feel he needed medication or follow up  He reported in the months leading up to returning to our office, he did notice some new symptoms including right-sided weakness, right hand tingling, occasional blurry vision  Following his visit to re-establish care, he had updated imaging completed  MRI brain 2/24/2022 compared to 4/2016  Significant disease progression noted including at least 10 enhancing supratentorial lesions  MRI c-spine 2/24/2022 compared to 6/2016  Multiple white matter lesions scattered throughout cervical spinal cord and possibly upper thoracic cord, new since prior exam   No enhancing cord lesions   Patient was called with these results and IV steroids were "offered  He completed 3 days of IV steroids 3/2, 3/3 and 3/4, overall tolerated well  Labs completed 2/19/22  Vit D low at 16  Neg Lyme, Sjogren's, Quantiferon Gold TB  JCV now positive with index 0 87  Normal CBC and CMP  Patient was seen on 3/25/22 and we discussed re-initiating DMT due to evidence of disease progression on imaging   He wanted to re-trial Aubagio, and start form was signed  \"    At last visit, noted several symptoms  Recently had had shingles  Was having difficulty with vision adjusting to the dark after being in the light  Had never been to an Ophthalmologist  Was evaluated by Neuropsychology and noted to have intact cognitive functioning but concern for depression  Doing well on Aubagio  Interval History: Today, Alea Jackson reports doing well  Has no new concerns or complaints  Continues to do well on Aubagio and denies side effects  Having issues with obtaining his prescription and recently missed 2-3 days in a row waiting for Rx  No recent illnesses, injuries, falls, or hospitalizations  Continues to have problems with vision - difficulty adjusting between dark/light  Has not been to the eye doctor as of yet  No issues with reading or distant vision  No history of optic neuritis  Zheng Tavarez also reports feeling tired all the time and frequent yawning  Has always been a poor sleeper  Wakes up multiple times per night  Has tried melatonin but it does nog agree with him  Taking Z-Quil which sometimes helps  Feeling generalized weakness  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  Continues with brain fog  At prior neuropsychology testing was noted to have depression overlay  He has been to therapy for a total of 6 sessions and then stopped as he did not feel it was beneficial     Additionally reports having right-sided eye pain/headache that can last anywhere from a few hours up to 1-2 days   " Whenever he has pain it is always on the right side of his body  Also has right ear pressure and sinus pressure  Has had a prior right-sided ENT procedure  Has not seen ENT in quite some time  Had updated MRI brain on 4/28/23, which identified slight disease progression compared to last MRI  There was no enhancement to suggest active demyelination  Repeat recommended in 6 months (October 2023)  Labs updated on 5/6/23  WBC slightly low at 4 14, ANC dropped from 2 15 to 1 90 and ALC stable at 1 72  LFTs are normal        The following portions of the patient's history were reviewed and updated as appropriate:   He  has a past medical history of Multiple sclerosis ([facility] )  He   Patient Active Problem List    Diagnosis Date Noted   • Frequent nocturnal awakening 05/15/2023   • Headache 05/15/2023   • Depression 11/16/2022   • Complaints of memory disturbance 07/06/2022   • Vitamin D deficiency 03/25/2022   • Multiple sclerosis ([facility] ) 01/28/2022   • Decreased libido 01/28/2022     He  has a past surgical history that includes [place] tooth extraction  His family history includes Cancer in his father; Multiple sclerosis in his maternal aunt  He  reports that he has never smoked  He has never used smokeless tobacco  He reports that he does not use drugs  No history on file for alcohol use  Current Outpatient Medications   Medication Sig Dispense Refill   • Aubagio 14 MG TABS Take 1 tablet (14 mg total) by mouth in the morning (Patient taking differently: Take 14 mg by mouth in the morning Takes at night) 90 tablet 0   • cholecalciferol (VITAMIN D3) 1,000 units tablet Take 1,000 Units by mouth daily     • Multiple Vitamins-Minerals (multivitamin with minerals) tablet Take 1 tablet by mouth daily         No current facility-administered medications for this visit       Current Outpatient Medications on File Prior to Visit   Medication Sig   • Aubagio 14 MG TABS Take 1 tablet (14 mg total) by mouth in the morning "(Patient taking differently: Take 14 mg by mouth in the morning Takes at night)   • cholecalciferol (VITAMIN D3) 1,000 units tablet Take 1,000 Units by mouth daily   • Multiple Vitamins-Minerals (multivitamin with minerals) tablet Take 1 tablet by mouth daily       No current facility-administered medications on file prior to visit  He has No Known Allergies            Objective:    Blood pressure 102/66, pulse 69, temperature (!) 97 °F (36 1 °C), height 5' 10\" (1 778 m), weight 61 2 kg (135 lb)  Physical Exam  Vitals and nursing note reviewed  Constitutional:       General: He is not in acute distress  Appearance: Normal appearance  He is not ill-appearing  HENT:      Head: Normocephalic and atraumatic  Nose: Nose normal       Mouth/Throat:      Mouth: Mucous membranes are moist       Pharynx: Oropharynx is clear  Eyes:      General: Lids are normal       Extraocular Movements: Extraocular movements intact  Conjunctiva/sclera: Conjunctivae normal       Pupils: Pupils are equal, round, and reactive to light  Funduscopic exam:     Right eye: No papilledema  Red reflex present  Left eye: No papilledema  Red reflex present  Cardiovascular:      Rate and Rhythm: Normal rate  Pulses: Normal pulses  Pulmonary:      Effort: Pulmonary effort is normal  No respiratory distress  Abdominal:      General: Abdomen is flat  There is no distension  Musculoskeletal:      Cervical back: Normal range of motion and neck supple  Right lower leg: No edema  Left lower leg: No edema  Skin:     General: Skin is warm and dry  Capillary Refill: Capillary refill takes less than 2 seconds  Neurological:      Motor: Motor strength is normal       Deep Tendon Reflexes:      Reflex Scores:       Tricep reflexes are 2+ on the right side and 2+ on the left side  Bicep reflexes are 2+ on the right side and 2+ on the left side         Brachioradialis reflexes are 2+ on the " right side and 2+ on the left side  Patellar reflexes are 2+ on the right side and 2+ on the left side  Achilles reflexes are 2+ on the right side and 2+ on the left side  Psychiatric:         Mood and Affect: Mood normal          Speech: Speech normal          Behavior: Behavior normal          Neurological Exam  Mental Status  Awake, alert and oriented to person, place and time  Speech is normal  Language is fluent with no aphasia  Cranial Nerves  CN II: Visual fields full to confrontation  Right funduscopic exam: disc intact  The right no papilledema  Left funduscopic exam: disc intact  The left no papilledema  CN III, IV, VI: Extraocular movements intact bilaterally  Normal lids and orbits bilaterally  Pupils equal round and reactive to light bilaterally  CN V: Facial sensation is normal   CN VII:  Right: There is no facial weakness  Left: There is no facial weakness  CN VIII: Hearing is normal   CN IX, X: Palate elevates symmetrically  CN XI:  Right: Sternocleidomastoid strength is normal  Trapezius strength is normal   Left: Sternocleidomastoid strength is normal  Trapezius strength is normal   CN XII: Tongue midline without atrophy or fasciculations  Motor  Normal muscle bulk throughout  No fasciculations present  Normal muscle tone  No abnormal involuntary movements  Strength is 5/5 throughout all four extremities  Sensory  Light touch is normal in upper and lower extremities  Pinprick is normal in upper and lower extremities  Temperature is normal in upper and lower extremities  Vibration is normal in upper and lower extremities  Proprioception is normal in upper and lower extremities       Reflexes                                            Right                      Left  Brachioradialis                    2+                         2+  Biceps                                 2+                         2+  Triceps                                2+ 2+  Patellar                                2+                         2+  Achilles                                2+                         2+    Coordination  Right: Finger-to-nose normal  Rapid alternating movement normal Left: Finger-to-nose normal  Rapid alternating movement normal     Gait  Casual gait is normal including stance, stride, and arm swing  Able to rise from chair without using arms  ROS:    Review of Systems   Constitutional: Negative  Negative for appetite change and fever  HENT: Negative  Negative for hearing loss, tinnitus, trouble swallowing and voice change  Eyes: Negative  Negative for photophobia, pain and visual disturbance  Respiratory: Negative  Negative for shortness of breath  Cardiovascular: Negative  Negative for palpitations  Gastrointestinal: Negative  Negative for nausea and vomiting  Endocrine: Negative  Negative for cold intolerance  Genitourinary: Negative  Negative for dysuria, frequency and urgency  Musculoskeletal: Negative  Negative for gait problem, myalgias and neck pain  Skin: Negative  Negative for rash  Allergic/Immunologic: Negative  Neurological: Negative  Negative for dizziness, tremors, seizures, syncope, facial asymmetry, speech difficulty, weakness, light-headedness, numbness and headaches  Hematological: Negative  Does not bruise/bleed easily  Psychiatric/Behavioral: Negative  Negative for confusion, hallucinations and sleep disturbance  All other systems reviewed and are negative  Feels as if he has slightly declined or more aware of his issues/brain fog    ======    I have discussed the patient's history, physical exam findings, assessment, and plan in detail with attending, Dr eDe Chaidez    Thank you for allowing me to participate in the care of your patient, Zacarias Oneill      Maryse Ivory DO  [facility]'s Neurology Residency, PGY-3

## 2023-05-15 NOTE — PATIENT INSTRUCTIONS
MRI in approximately 6 months (end of October)  Labs in 3 months (Early August)  We will have our nurse look into the Aubagio prescription   Even if you miss a dose, it has a very long half life and stays in your system for 1-2 years  Please see Ophthalmology - referral to Dr Rody Elena  If he is not covered by your insurance then please see someone who is on your plan   Must be Ophthalmology (NOT Optometry)  Referral to occupational therapy for cognitive therapy if you would like to try it  Referral to sleep med  Would recommend setting up an appointment with a headache center such as [facility]  Please call if you have any questions  Follow up in approximately 6 months with Monique Finnegan

## 2023-05-15 NOTE — TELEPHONE ENCOUNTER
Please check with [facility] if a new rx for 30day refill for aubagio with 11 refills is needed  We had to put med on hold as we were waiting for overdue labs to be done  Please make sure pt is back on track with his pharmacy for routine monthly deliveries

## 2023-05-15 NOTE — ASSESSMENT & PLAN NOTE
Several years of pain behind the right eye  Reports having significant sinus issues on that side, and prior nasal procedure  He will have headaches that last for 30 minutes up to a day or 2 in duration  Recommended following up with his ENT  Also recommended that he be seen by a headache center such as [facility]

## 2023-05-15 NOTE — ASSESSMENT & PLAN NOTE
Patient had formal neuropsychology testing which was normal   There was concern for possible depression overlay, and patient has completed a course of therapy  We will also refer to occupational therapy for cognitive therapy services

## 2023-05-15 NOTE — ASSESSMENT & PLAN NOTE
Reports poor sleep since childhood, now having frequent nighttime awakenings that disrupts his sleep  Does not feel rested during the day    Will refer to sleep medicine for further evaluation

## 2023-05-15 NOTE — ASSESSMENT & PLAN NOTE
Patient here in follow up for MS diagnosed in 2016  He reports no new symptoms or progression of symptoms since last visit  No recent illnesses, injuries, or falls  No hospitalizations  He is maintained on Aubagio and is tolerating it well  Having issues with obtaining his medication and will have our [address] look into the Rx and pharmacy to ensure that he continues to receive his medication  Continues to complain of brain fog  Had prior neuropsychology testing which revealed normal cognitive functioning with depression overlay  He tried therapy for about 6 sessions and then stopped as he did not feel it was beneficial   Not had any medications for mood  Placed referral to Occupational Therapy for cognitive therapy if patient chooses to pursue this option  Additionally feels tired throughout the day and reports frequent nocturnal awakenings  Will refer to sleep medicine for additional evaluation  He is also overdue for eye exam and referral was placed to ophthalmology, Dr Ivey Alma  Continue on Aubagio  His last labs were notable for slightly lower WBC and ANC, and thus will repeat in 3 months  His most recent MRI from April also showed slight disease progression and thus we will repeat in 6 months  Continue to remain as physically active as possible    Robinson Penaloza in approximately 6 months

## 2023-05-19 NOTE — TELEPHONE ENCOUNTER
Please see 4/12/23 Refill encounter for additional details  [address] [phone]  Spoke w/ S  She advised Brand Aubagio 14mg 30 day supply was received by pt on 5/18/23  There is one refill remaining on script  Spoke w/pt  He confirmed he received Aubagio shipment  I advised pt there is one refill left on rx  Requested he call office once that is filled so that we may send new rx for Brand Aubagio  Pt verbalized understanding

## 2023-05-22 ENCOUNTER — EVALUATION (OUTPATIENT)
Dept: OCCUPATIONAL THERAPY | Facility: MEDICAL CENTER | Age: 33
End: 2023-05-22

## 2023-05-22 DIAGNOSIS — R41.3 COMPLAINTS OF MEMORY DISTURBANCE: ICD-10-CM

## 2023-05-22 DIAGNOSIS — G35 MULTIPLE SCLEROSIS (HCC): Primary | ICD-10-CM

## 2023-05-22 NOTE — PROGRESS NOTES
OCCUPATIONAL THERAPY INITIAL EVALUATION:      5/22/2023  Kymberly Cassidykimmy  1990  [id number]  Saad Reid DO   Diagnosis ICD-10-CM Associated Orders   1  Multiple sclerosis ([facility] [address] )  [address] Ambulatory Referral to Occupational Therapy      2  Complaints of memory disturbance  R41  3 Ambulatory Referral to Occupational Therapy        POC START DATE: 5/22/2023  POC END DATE: 8/14/2023  NEXT PN DUE: 6/22/2023  FREQUENCY: 1-2x/wk for 12 weeks    Assessment/Plan    SKILLED ANALYSIS:    Pt presents to OT evaluation on 5/22/2023 secondary to Multiple Sclerosis  MS diagnosed in 2016  Reports difficulty with memory during conversation with customers  Brain fog is worse in the morning  Pt has additional concerns with fine motor coordination (typing and writing for work) and overall strength  RBANS was administered to assess potential cognitive deficits  Pt overall scored within the low average classification range compared to age norms  Scored within the low average range for immediate recall and visuospatial  Scored within the average range on language, attention, and delayed recall abilities  Pt would benefit from higher level cognitive interventions including alternating attention and cognitive loading  Fine motor coordination and strength to be assessed next visit with additional outcome measures  Pt will be seen for OT services 1-2x/wk for 12 weeks  POC was reviewed with the pt, pt understands and agrees with POC  SUBJECTIVE:    Pt presents to OT evaluation on 5/22/2023 secondary to Multiple Sclerosis  MS diagnosed in 2016  Diagnosed with dizziness/vertigo, reports it was a misdiagnosed  Reports difficulty with memory during conversation with customers  Was slurring words  Difficulty with word finding in social and work settings  Was provided with script for an MRI in 2016 and then went to Neurology (Dr Rob Hendricks)  Stopped taking medication for 3-4 years, went back on 2020 medication   Tremors through bilateral "hands, however, reports that it is has been the case his whole life  Numbness from elbow to digits in R hand  Decreased strength and coordination through R hand  Brain fog worse in the morning  Fatigued almost all the time  \"Feels physically exhausted, but mentally cannot sleep  \" Numbness through index and thumb R handed  Following up with Neurology approximately every 6 months  Balance deficits only in the morning  Hand Dominant: R     OT PROFILE:    ADLs: Occasional difficulty with buttoning  IADLS: Still driving, no difficulty    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  Previously difficulty holding a mouse  Difficulty with digit isolation for typing  Sleep: Approximately 4 hours a sleep at night  Will be getting a sleep study  Pt's Goal:     PAIN:        Assessments  The Repeatable Battery for the Assessment of Neuropsychological Status (RBANS) is a brief, individually-administered assessment which measures attention, language, visuospatial/constructional abilities, and immediate & delayed memory  The RBANS is intended for use with adolescents to adults, ages 15 to 80 years  The following results were obtained during the administration of the assessment  Form: A    Cognitive Domain/Subtest: Index Score: Percentile Rank: Classification: IE: Status:   IMMEDIATE MEMORY 83 83%ile LOW AVERAGE          1  List Learning (19/40)          2  Story Memory (20/24)           VISUOSPATIAL/  CONSTRUCTIONAL 84 84%ile LOW AVERAGE          3  Figure Copy (19/20)          4  Line Orientation (13/20)           LANGUAGE 101 101%ile AVERAGE          5  Picture Naming (10/10)          6  Semantic Fluency (21/40)           ATTENTION 91 91%ile AVERAGE          7  Digit Span (12/16)          8  Coding (47/89)           DELAYED MEMORY 97 97%ile AVERAGE          9  List Recall (3/10)          10  List Recognition (20/20)          11  Story Recall (11/12)          12   Figure Recall (20/20)       Sum of " Index Scores:  456      Total Score:  87      Percentile: 19%ile      Classification: LOW AVERAGE        “IE” indicates the scores from the initial evaluation (5/22/2023)  Form: A    SHORT TERM GOALS:      Pt will demo G recall of 75% of verbal/written information utilizing memory strategy of choice for improved STM/delayed memory     Pt will demo G carryover of use of internal/external memory strategy aides for improved recall of daily events, with 75% accuracy     Pt will increase immediate list memory recall being able to recall > 22/40 items on RBANS     Pt will increase immediate story memory recall being able to recall >22/24 on RBANs    Pt will increase delayed list memory recall being able to recall >5/10 on RBANS    Pt will maintain attention to task for 30 minutes in semi modal environment for baseline performance,  improved role performance and to improve learning and to simulate return to IADLs and complete cooking/cleaning tasks       Pt will demo ability to participate in dual tasking/divided attention task with 50% accuracy in multimodal environment to simulate return to life and work roles    Pt will demo ability to alternate attention between 2 tasks with cog loading and 50% accuracy with G retention of task directions in multimodal environment to simulate return to life and work  roles     LONG TERM GOALS    Pt will demo G recall of 90% of verbal/written information utilizing memory strategy of choice for improved STM/delayed memory     Pt will demo G carryover of use of internal/external memory strategy aides for improved recall of daily events, with 90 % accuracy     Pt will increase immediate list memory recall being able to recall > 25/40 items on RBANS     Pt will increase immediate story memory recall being able to recall >24/24 on RBANs    Pt will increase delayed list memory recall being able to recall >7/10 on RBANS    Pt will maintain attention to task for 45 minutes in semi modal environment for baseline performance,  improved role performance and to improve learning and to simulate return to IADLs and complete cooking/cleaning tasks       Pt will demo ability to participate in dual tasking/divided attention task with >75% accuracy in multimodal environment to simulate return to life and work roles    Pt will demo ability to alternate attention between 2 tasks with cog loading and >75% accuracy with G retention of task directions in multimodal environment to simulate return to life and work  roles       TIME SPENT 60min for administration, documentation, interpretation, scoring adn POC development RBANS    PLANNED THERAPY INTERVENTIONS:  Internal and external memory aides  Hypersensitivity strategies education  Multi-modal environment  Sustained/alternating/divided attention  Temporal Awareness: Organize the Hour activities  Memory and mental manipulation  Auditory processing with immediate recall  Memory retention with immediate and delayed recall  Edu on cog/vision apps

## 2023-06-01 ENCOUNTER — OFFICE VISIT (OUTPATIENT)
Dept: OCCUPATIONAL THERAPY | Facility: MEDICAL CENTER | Age: 33
End: 2023-06-01

## 2023-06-01 DIAGNOSIS — R41.3 COMPLAINTS OF MEMORY DISTURBANCE: ICD-10-CM

## 2023-06-01 DIAGNOSIS — G35 MULTIPLE SCLEROSIS (HCC): Primary | ICD-10-CM

## 2023-06-01 NOTE — PROGRESS NOTES
"Occupational Therapy Daily Note:    Today's date: 2023  Patient name: Steve Matias  : 1990  MRN: [id number]  Referring provider: Parveen Khan DO  Dx:   Encounter Diagnoses   Name Primary? • [facility] ([facility] ) Yes   • Complaints of memory disturbance        POC START DATE: 2023  POC END DATE: 2023  NEXT PN DUE: 2023  FREQUENCY: 1-2x/wk for 12 weeks    Subjective: \"I do not really have trouble manipulating small things  \"     Objective: Therapeutic Exercise:     Proximal Strengthening     Scapular Retraction  Green TherapyBand  2 x 10    Shoulder Extension  Green TherapyBand  2 x 10    External Rotation  Green TherapyBand  2 x 10    Theraputty Exercises     Patient educated and instructed on home exercise program for FMS/FMC with use of soft/medium resist theraputty to inc indep with ADL fxn including container management, fastener management, and item retrieval  Pt provided with demonstration and verbal instruction and demo G understanding of task  Exercises instructed as followed: , Pinch, Roll    9 HOLE PEG TEST: performed 9 hole peg test to assess dexterity/fine motor coordination with pt scoring 27 seconds on R hand (affected) and 24 seconds on L hand (unaffected) side       Functional Dexterity Test for in-hand manipulation    R UE: 31 seconds no drops     L UE: 31 seconds no drops      PROPRIOCEPTION: R hand normal  L hand normal  Slight tremor    MONOFILAMENTS/SENSORY TESTING:  RUE: 2 83 normal sensation  LUE: 2 83 normal sensation    Impairments Section:  UE Strength:              ESTHER: RUE: 58/200 LUE: 78/200              2 point pinch: RUE: 11, LUE: 11   Lateral Pinch: RUE: 17lbs, LUE: 17lbs   Three Jaw Otf: RUE: 14, LUE: 14                Range of Motion:  AROM:   RUE   -  shoulder flexion  - elbow flexion  -  elbow extension   -   wrist flexion  -  wrist extension    LUE within normal limits     MMT:  R UE: 4+/5 in all pivots - History of shoulder " pain  -  shoulder flexion  - elbow flexion  -  elbow extension   -   wrist flexion  -  wrist extension      L UE 5/5 in all pivots   -  shoulder flexion  - elbow flexion  -  elbow extension   -   wrist flexion  -  wrist extension       Pt is R hand dominant    Assessment: Tolerated treatment well  Continued with outcome measures today in continuation of IE  Weakness in RUE compared to LUE  Impaired  strength on R when compared to L  L=R pinch strength  Pt demonstrated good fine motor coordination as indicated by Functional Dexterity test and Nine Hole Peg test  Normal proprioception as indicated by finger to nose test  Pt was educated on proximal strengthening and provided with therapyband  Pt was educated on theraputty exercises for  strength  Educated patient on energy conservation with all exercises  Patient would benefit from continued skilled OT  Plan: Continued skilled OT per POC

## 2023-06-05 ENCOUNTER — APPOINTMENT (OUTPATIENT)
Dept: OCCUPATIONAL THERAPY | Facility: MEDICAL CENTER | Age: 33
End: 2023-06-05
Payer: COMMERCIAL

## 2023-06-05 ENCOUNTER — TELEPHONE (OUTPATIENT)
Dept: OBGYN CLINIC | Facility: HOSPITAL | Age: 33
End: 2023-06-05

## 2023-06-12 ENCOUNTER — OFFICE VISIT (OUTPATIENT)
Dept: OCCUPATIONAL THERAPY | Facility: MEDICAL CENTER | Age: 33
End: 2023-06-12
Payer: COMMERCIAL

## 2023-06-12 DIAGNOSIS — R41.3 COMPLAINTS OF MEMORY DISTURBANCE: ICD-10-CM

## 2023-06-12 DIAGNOSIS — G35 MULTIPLE SCLEROSIS (HCC): Primary | ICD-10-CM

## 2023-06-12 PROCEDURE — 97112 NEUROMUSCULAR REEDUCATION: CPT

## 2023-06-12 NOTE — PROGRESS NOTES
"Occupational Therapy Daily Note:    Today's date: 2023  Patient name: Karena De La Rosa  : 1990  MRN: [id number]  Referring provider: Silvia Payne DO  Dx:   Encounter Diagnoses   Name Primary? • [facility] Yes   • Complaints of memory disturbance        POC START DATE: 2023  POC END DATE: 2023  NEXT PN DUE: 2023  FREQUENCY: 1-2x/wk for 12 weeks    Subjective: \"My energy is decent  \"    Objective:     Neuromuscular Re-education:    UBE Prograde and Retrograde  Duration 3 minute prograde, 3 minute retrograde    Proximal Strengthening     Scapular Retraction  Green TherapyBand  2 x 10    Shoulder Extension  Green TherapyBand  2 x 10    External Rotation  Green TherapyBand  2 x 10    Functional Reaching and UE strength  Red proprioceptive band   Double Spot Palm to Fingertip and Translation  Time: 10 minutes    Functional reaching and hand strength  Tennis Ball Gripper   L/R Wrist weights #2  Time: 10 minutes    Hand Strength  Yellow Theraputty with Puttycise Tools  Time: 10 minutes    Assessment: Tolerated treatment well  Pt demonstrated good endurance on UBE prograde and retrograde today  Rest break in between therapyband exercises for energy conservation  Fatigue with tennis ball gripper and puttycise tools  Patient would benefit from continued skilled OT  Plan: Continued skilled OT per POC    "

## 2023-06-13 DIAGNOSIS — G35 MULTIPLE SCLEROSIS (HCC): ICD-10-CM

## 2023-06-13 RX ORDER — RENAGEL 800 MG/1
14 TABLET ORAL DAILY
Qty: 90 TABLET | Refills: 0 | Status: SHIPPED | OUTPATIENT
Start: 2023-06-13

## 2023-06-19 ENCOUNTER — OFFICE VISIT (OUTPATIENT)
Dept: OCCUPATIONAL THERAPY | Facility: MEDICAL CENTER | Age: 33
End: 2023-06-19
Payer: COMMERCIAL

## 2023-06-19 DIAGNOSIS — G35 MULTIPLE SCLEROSIS (HCC): Primary | ICD-10-CM

## 2023-06-19 DIAGNOSIS — R41.3 COMPLAINTS OF MEMORY DISTURBANCE: ICD-10-CM

## 2023-06-19 PROCEDURE — 97112 NEUROMUSCULAR REEDUCATION: CPT

## 2023-06-19 NOTE — PROGRESS NOTES
"Occupational Therapy Daily Note:    Today's date: 2023  Patient name: Anjana Salinas  : 1990  MRN: [id number]  Referring provider: Abdullahi Linda DO  Dx:   Encounter Diagnoses   Name Primary? • Multiple sclerosis ([facility] [address] ) Yes   • Complaints of memory disturbance        POC START DATE: 2023  POC END DATE: 2023  NEXT PN DUE: 2023  FREQUENCY: 1-2x/wk for 12 weeks    Subjective: \"My energy is decent  \"    Objective:     Neuromuscular Re-education:    UBE Prograde and Retrograde  Duration 3 minute prograde, 3 minute retrograde    Working Memory, Memory Recall  Memorization of three cards at a time   Placing onto board  Time: 10 minutes     Strength, Functional Reaching  Translating Dowels with Gripper on Level 2  Time: 10 minutes  L/R  1 5 wrist weights bilaterally    Tidal Tank Upright Rows  Seated  L/R 3 x 10    Functional Reaching,  Strength  Pulling Squiz off of table and placing into bucket across midline  Time: 10 minutes    Assessment: Tolerated treatment well  Pt able to maintain speed on UBE for a total of 6 minutes with one rest break  Fatigue with use of gripper translating dowels  Re-checks required for accuracy with 40 card sort memorizing three cards at a time  Patient would benefit from continued skilled OT  Plan: Continued skilled OT per POC    "

## 2023-06-26 ENCOUNTER — EVALUATION (OUTPATIENT)
Dept: OCCUPATIONAL THERAPY | Facility: MEDICAL CENTER | Age: 33
End: 2023-06-26
Payer: COMMERCIAL

## 2023-06-26 DIAGNOSIS — R41.3 COMPLAINTS OF MEMORY DISTURBANCE: ICD-10-CM

## 2023-06-26 DIAGNOSIS — G35 MULTIPLE SCLEROSIS (HCC): Primary | ICD-10-CM

## 2023-06-26 PROCEDURE — 96125 COGNITIVE TEST BY HC PRO: CPT

## 2023-06-26 NOTE — PROGRESS NOTES
OCCUPATIONAL THERAPY RE EVALUATION:    6/26/2023  Anjana Salinas  1990  [id number]  Abdullahi Linda DO   Diagnosis ICD-10-CM Associated Orders   1  Multiple sclerosis ([facility] [address] )  G35       2  Complaints of memory disturbance  R41 3          POC START DATE: 5/22/2023  POC END DATE: 8/14/2023  NEXT PN DUE: 6/22/2023  FREQUENCY: 1-2x/wk for 12 weeks    Assessment/Plan    SKILLED ANALYSIS:    Pt presents to OT evaluation on 5/22/2023 secondary to Multiple Sclerosis  MS diagnosed in 2016  Reports difficulty with memory during conversation with customers  Brain fog is worse in the morning  Pt has additional concerns with fine motor coordination (typing and writing for work) and overall strength  RBANS was administered to assess potential cognitive deficits  Pt overall scored within the low average classification range compared to age norms  Scored within the low average range for immediate recall and visuospatial  Scored within the average range on language, attention, and delayed recall abilities  Pt would benefit from higher level cognitive interventions including alternating attention and cognitive loading  Fine motor coordination and strength to be assessed next visit with additional outcome measures  Pt will be seen for OT services 1-2x/wk for 12 weeks  POC was reviewed with the pt, pt understands and agrees with POC  Pt presents to OT re-evaluation on 6/26/2023 secondary to Multiple Sclerosis  Pt reports that he has been compliant with HEP  Pt demonstrated significantly improved  and pinch strength today L/R when compared to IE  Maintained UE strength, R continues to be slightly weaker than L  Normal Nine Hole Peg test and Functional Dexterity Test score when compared to norms  RBANS administered today to assess progress or continued cognitive deficits  Pt overall scored within the low average range when compared to norms, slightly decreased performance when compared to IE   Decreased performance on the "visuospatial/constructional section when compared to IE  Falls within the average rage on attention and delayed memory  Pt will continue to benefit from OT services to address higher level cognitive skills needed to perform everyday work tasks (I e  divided attention, alternating attention, multitasking)  Pt will be seen for OT services 1/wk or every other week for 12 weeks  POC was reviewed with the pt, pt understands and agrees with POC  SUBJECTIVE:    Pt presents to OT evaluation on 5/22/2023 secondary to Multiple Sclerosis  MS diagnosed in 2016  Diagnosed with dizziness/vertigo, reports it was a misdiagnosed  Reports difficulty with memory during conversation with customers  Was slurring words  Difficulty with word finding in social and work settings  Was provided with script for an MRI in 2016 and then went to Neurology (Dr Leonora Feng)  Stopped taking medication for 3-4 years, went back on 2020 medication  Tremors through bilateral hands, however, reports that it is has been the case his whole life  Numbness from elbow to digits in R hand  Decreased strength and coordination through R hand  Brain fog worse in the morning  Fatigued almost all the time  \"Feels physically exhausted, but mentally cannot sleep  \" Numbness through index and thumb R handed  Following up with Neurology approximately every 6 months  Balance deficits only in the morning  Pt presents to OT re-evaluation on 6/26/2023 secondary to MS  \"I am learning some new things  \" \"Hard to say if things have gotten better  \" \"My  would still say that my memory is not good  \"    Hand Dominant: R     OT PROFILE:    ADLs: Occasional difficulty with buttoning  IADLS: Still driving, no difficulty    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  Previously difficulty holding a mouse  Difficulty with digit isolation for typing  Sleep: Approximately 4 hours a sleep at night  Will be getting a sleep study       Pt's Goal: " PAIN:    Assessments  The Repeatable Battery for the Assessment of Neuropsychological Status (RBANS) is a brief, individually-administered assessment which measures attention, language, visuospatial/constructional abilities, and immediate & delayed memory  The RBANS is intended for use with adolescents to adults, ages 15 to 80 years  The following results were obtained during the administration of the assessment  Form: B    Cognitive Domain/Subtest: Index Score: Percentile Rank: Classification: IE: Status:   IMMEDIATE MEMORY 85 83%ile LOW AVERAGE 83         1  List Learning (23/40)          2  Story Memory (20/24)           VISUOSPATIAL/  CONSTRUCTIONAL 69 84%ile EXTREMELY LOW 84         3  Figure Copy (10/20)          4  Line Orientation (17/20)           LANGUAGE 87 101%ile  LOW AVERAGE 101         5  Picture Naming (10/10)          6  Semantic Fluency (70/54)           ATTENTION 91 91%ile AVERAGE 91         7  Digit Span (12/16)          8  Coding (48/89)           DELAYED MEMORY 97 97%ile AVERAGE 97         9  List Recall (5/10)          10  List Recognition (19/20)          11  Story Recall (11/12)          12  Figure Recall (18/20)       Sum of Index Scores:  429 (previously 456)      Total Score:  81      Percentile: 10%ile      Classification: LOW AVERAGE        “IE” indicates the scores from the initial evaluation (6/26/2023)  Form: B    9 HOLE PEG TEST: performed 9 hole peg test to assess dexterity/fine motor coordination with pt scoring 27 seconds (previously 27 seconds)  on R hand (affected) and 23 (previously 24 seconds) on L hand (unaffected) side       Functional Dexterity Test    R UE: 34 seconds (previously 31 seconds no drops)   L UE: 31 seconds (previously 31 seconds no drops)    Impairments Section:  UE Strength:              ESTHER: RUE: 78/200 (previously 58/200) LUE: 82/200 (previously 78/200)               2 point pinch: RUE: 14 (previously 11), LUE: 14 (previously 11) Lateral Pinch: RUE: 19 (previously 17lbs), LUE: 19 (previously 17lbs)              Three Jaw Otf: RUE: 16 (previously 14) , LUE: 19 (previously 14)                Range of Motion:  AROM:   RUE   -  shoulder flexion  - elbow flexion  -  elbow extension   -   wrist flexion  -  wrist extension    LUE within normal limits     MMT:  R UE: 4+/5 in all pivots - History of shoulder pain  -  shoulder flexion  - elbow flexion  -  elbow extension   -   wrist flexion  -  wrist extension       L UE 5/5 in all pivots   -  shoulder flexion  - elbow flexion  -  elbow extension   -   wrist flexion  -  wrist extension       Pt is R hand dominant    SHORT TERM GOALS:      Pt will demo G recall of 75% of verbal/written information utilizing memory strategy of choice for improved STM/delayed memory - MET    Pt will demo G carryover of use of internal/external memory strategy aides for improved recall of daily events, with 75% accuracy - MET    Pt will increase immediate list memory recall being able to recall > 22/40 items on RBANS - MET    Pt will increase immediate story memory recall being able to recall >22/24 on RBANs - NOT MET    Pt will increase delayed list memory recall being able to recall >5/10 on RBANS - MET    Pt will maintain attention to task for 30 minutes in semi modal environment for baseline performance,  improved role performance and to improve learning and to simulate return to IADLs and complete cooking/cleaning tasks   - MET    Pt will demo ability to participate in dual tasking/divided attention task with 50% accuracy in multimodal environment to simulate return to life and work roles - PROGRESSING    Pt will demo ability to alternate attention between 2 tasks with cog loading and 50% accuracy with G retention of task directions in multimodal environment to simulate return to life and work  roles - [address] [place] [address]    Pt will demo G recall of 90% of verbal/written information utilizing memory strategy of choice for improved STM/delayed memory - MET    Pt will demo G carryover of use of internal/external memory strategy aides for improved recall of daily events, with 90 % accuracy - MET    Pt will increase immediate list memory recall being able to recall > 25/40 items on RBANS - NOT MET    Pt will increase immediate story memory recall being able to recall >24/24 on RBANs - NOT MET    Pt will increase delayed list memory recall being able to recall >7/10 on RBANS - NOT MET    Pt will maintain attention to task for 45 minutes in semi modal environment for baseline performance,  improved role performance and to improve learning and to simulate return to IADLs and complete cooking/cleaning tasks    - MET    Pt will demo ability to participate in dual tasking/divided attention task with >75% accuracy in multimodal environment to simulate return to life and work roles - NOT MET    Pt will demo ability to alternate attention between 2 tasks with cog loading and >75% accuracy with G retention of task directions in multimodal environment to simulate return to life and work  roles - NOT MET    Herbert Balbuena  90 min for administration, documentation, interpretation, scoring and POC development RBANS    PLANNED THERAPY INTERVENTIONS:  Internal and external memory aides  Hypersensitivity strategies education  Multi-modal environment  Sustained/alternating/divided attention  Temporal Awareness: Organize the Hour activities  Memory and mental manipulation  Auditory processing with immediate recall  Memory retention with immediate and delayed recall  Edu on cog/vision apps

## 2023-07-10 ENCOUNTER — APPOINTMENT (OUTPATIENT)
Dept: OCCUPATIONAL THERAPY | Facility: MEDICAL CENTER | Age: 33
End: 2023-07-10
Payer: COMMERCIAL

## 2023-07-17 ENCOUNTER — OFFICE VISIT (OUTPATIENT)
Dept: OCCUPATIONAL THERAPY | Facility: MEDICAL CENTER | Age: 33
End: 2023-07-17
Payer: COMMERCIAL

## 2023-07-17 DIAGNOSIS — G35 MULTIPLE SCLEROSIS (HCC): Primary | ICD-10-CM

## 2023-07-17 DIAGNOSIS — R41.3 COMPLAINTS OF MEMORY DISTURBANCE: ICD-10-CM

## 2023-07-17 PROCEDURE — 97112 NEUROMUSCULAR REEDUCATION: CPT

## 2023-07-17 NOTE — PROGRESS NOTES
Occupational Therapy Daily Note:    Today's date: 2023  Patient name: Nia Hernandez  : 1990  MRN: [id number]  Referring provider: Erickson Barboza DO  Dx:   Encounter Diagnoses   Name Primary? • Multiple sclerosis ([address]) Yes   • Complaints of memory disturbance        POC START DATE: 2023  POC END DATE: 2023  NEXT PN DUE: 2023  FREQUENCY: 1-2x/wk for 12 weeks    Subjective: "No complaints."    Objective:     Neuromuscular Re-education:    UBE Prograde and Retrograde  Duration 3 minute prograde, 3 minute retrograde    Fine Motor Coordination Problem Solving  Small Letter Beads   Powerweb Empty Crossword Puzzle  Wrist weight 1.5 lbs     Fine Motor Coordination  Grooved Pegboard   Translation of Pegs and placing into board  1.5 lb wrist weight    Assessment: Tolerated treatment well. Good endurance exhibited on UBE prograde and retrograde. Good endurance and fine motor coordination with translation of small letter beads into powerweb. Min vc for empty crossword fill in. Patient would benefit from continued skilled OT. Plan: Continued skilled OT per POC.

## 2023-07-24 ENCOUNTER — OFFICE VISIT (OUTPATIENT)
Dept: OCCUPATIONAL THERAPY | Facility: MEDICAL CENTER | Age: 33
End: 2023-07-24
Payer: COMMERCIAL

## 2023-07-24 DIAGNOSIS — G35 MULTIPLE SCLEROSIS (HCC): Primary | ICD-10-CM

## 2023-07-24 DIAGNOSIS — R41.3 COMPLAINTS OF MEMORY DISTURBANCE: ICD-10-CM

## 2023-07-24 PROCEDURE — 97112 NEUROMUSCULAR REEDUCATION: CPT

## 2023-07-24 NOTE — PROGRESS NOTES
Occupational Therapy Daily Note:    Today's date: 2023  Patient name: Bairon Martines  : 1990  MRN: [id number]  Referring provider: Paris Tafoya DO  Dx:   Encounter Diagnoses   Name Primary? • Multiple sclerosis ([address]) Yes   • Complaints of memory disturbance        POC START DATE: 2023  POC END DATE: 2023  NEXT PN DUE: 2023  FREQUENCY: 1-2x/wk for 12 weeks    Subjective: "My fingertips on my R hand are numb today."    Objective:     Neuromuscular Re-education:    Working Memory, Vertical Saccades, Visual Scanning  120 Number Board   Memorization of 3-4 at a time   Fine Motor coordination to  numbers and place into board  Time: 20 minutes     Fine Motor Coordination, Tremor Management, Digit Opposition  Thimble with Tangram Puzzle  Completed x 3 independently  Time: 15 minutes    Executive Functioning, Problem Solving, handwriting, fine motor control  Floor Plan Sketch  Time: 20 minutes    Assessment: Tolerated treatment well. Pt demonstrated good visual scanning abilities with 120 number board. Memorization of three numbers at a time with few vc for scanning of numbers. No drops with tangram digit opposition using thimbles secondary to numbness of fingertips. Able to complete entirety of floor plan sketch with 100% accuracy. Patient would benefit from continued skilled OT. Plan: Continued skilled OT per POC.

## 2023-08-07 ENCOUNTER — PATIENT MESSAGE (OUTPATIENT)
Dept: NEUROLOGY | Facility: CLINIC | Age: 33
End: 2023-08-07

## 2023-08-07 ENCOUNTER — EVALUATION (OUTPATIENT)
Dept: OCCUPATIONAL THERAPY | Facility: MEDICAL CENTER | Age: 33
End: 2023-08-07
Payer: COMMERCIAL

## 2023-08-07 DIAGNOSIS — G35 MULTIPLE SCLEROSIS (HCC): ICD-10-CM

## 2023-08-07 DIAGNOSIS — R41.3 COMPLAINTS OF MEMORY DISTURBANCE: ICD-10-CM

## 2023-08-07 DIAGNOSIS — G35 MULTIPLE SCLEROSIS (HCC): Primary | ICD-10-CM

## 2023-08-07 PROCEDURE — 96125 COGNITIVE TEST BY HC PRO: CPT

## 2023-08-07 RX ORDER — RENAGEL 800 MG/1
14 TABLET ORAL DAILY
Qty: 90 TABLET | Refills: 0 | Status: CANCELLED | OUTPATIENT
Start: 2023-08-07

## 2023-08-07 NOTE — PATIENT COMMUNICATION
Good afternoon, this is Sunday Walsh calling on behalf of Elberta Cranker,  [birth year]. I'm calling in regards to his Aubagio script. It appears that we have exhausted all of our co pay options for assistance and looks like the cost is now going to be close to $2000.00 a month. So I'm just wondering if you guys now have a different program or is it perhaps better just versus a generic? Is that a different option in regards to cost effectiveness just because $2000.00 is not feasible for us to continue on this medication. If you could give me a call back, I'd greatly appreciate it. My number is [phone]. Thank you.  Dani.

## 2023-08-07 NOTE — PROGRESS NOTES
OCCUPATIONAL THERAPY RE EVALUATION:    6/26/2023  Cloyde Soda  1990  [id number]  Lucia Chavez    Diagnosis ICD-10-CM Associated Orders   1. Multiple sclerosis ([address])  G35       2. Complaints of memory disturbance  R41.3            POC START DATE: 5/22/2023  POC END DATE: 8/14/2023  NEXT PN DUE: 6/22/2023  FREQUENCY: 1-2x/wk for 12 weeks    Assessment/Plan    SKILLED ANALYSIS:    Pt presents to OT evaluation on 5/22/2023 secondary to Multiple Sclerosis. MS diagnosed in 2016. Reports difficulty with memory during conversation with customers. Brain fog is worse in the morning. Pt has additional concerns with fine motor coordination (typing and writing for work) and overall strength. RBANS was administered to assess potential cognitive deficits. Pt overall scored within the low average classification range compared to age norms. Scored within the low average range for immediate recall and visuospatial. Scored within the average range on language, attention, and delayed recall abilities. Pt would benefit from higher level cognitive interventions including alternating attention and cognitive loading. Fine motor coordination and strength to be assessed next visit with additional outcome measures. Pt will be seen for OT services 1-2x/wk for 12 weeks. POC was reviewed with the pt, pt understands and agrees with POC. Pt presents to OT re-evaluation on 8/7/2023 secondary to Multiple Sclerosis. RBANS re-administered today to assess progress and continued deficits. Pt overall demonstrated improvement since last re-evaluation. Continues to fall within the low average range compared to norms. Scores within the average range for language and delayed memory. Pt improved  and pinch strength L/R when compared to last assessment. Maintained abilities with fine motor during Nine Hole Peg and Functional Dexterity test. Slight decrease in strength of RUE when compared to LUE.  Pt will be discharged today due to meeting nearly all therapeutic goals. Encouraged to contact therapist on an as needed basis. POC was reviewed with the pt, pt understands and agrees with POC. SUBJECTIVE:    Pt presents to OT evaluation on 5/22/2023 secondary to Multiple Sclerosis. MS diagnosed in 2016. Diagnosed with dizziness/vertigo, reports it was a misdiagnosed. Reports difficulty with memory during conversation with customers. Was slurring words. Difficulty with word finding in social and work settings. Was provided with script for an MRI in 2016 and then went to Neurology (Dr. Didi Campos). Stopped taking medication for 3-4 years, went back on 2020 medication. Tremors through bilateral hands, however, reports that it is has been the case his whole life. Numbness from elbow to digits in R hand. Decreased strength and coordination through R hand. Brain fog worse in the morning. Fatigued almost all the time. "Feels physically exhausted, but mentally cannot sleep." Numbness through index and thumb R handed. Following up with Neurology approximately every 6 months. Balance deficits only in the morning. Pt presents to OT re-evaluation on 8/7/2023 secondary to Multiple Sclerosis.  "My  strength is better." "All five of my fingertips are numb." "Work is going fine." "Energy is pretty good."       IMPAIRMENTS SECTION:   CONCUSSION COGNITIVE CHECKLIST:  *Patient indicated that he is experiencing the following symptoms:     · Memory:  · Remembering people's names  · Attention:   · Sustaining attention on a task  · Dividing Attention (I.e. multitasking)  · Processing:   · Responding to questions in a timely manner  · Executive Functions:   · Setting goals  · Communication:  · Word finding in conversation  · Expressing your thoughts and ideas fluently  · Visual:   · Losing spot on the page when reading  · Changing in handwriting   · Emotional:   · Frustration Tolerance  · Increased sensitivities to:   · Lighting     Hand Dominant: R     OT PROFILE:    ADLs: Occasional difficulty with buttoning. 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 Previously difficulty holding a mouse. Difficulty with digit isolation for typing. Sleep: Approximately 4 hours a sleep at night. Will be getting a sleep study. Pt's Goal: Did not state. PAIN:    No pain    Assessments  The Repeatable Battery for the Assessment of Neuropsychological Status (RBANS) is a brief, individually-administered assessment which measures attention, language, visuospatial/constructional abilities, and immediate & delayed memory. The RBANS is intended for use with adolescents to adults, ages 15 to 80 years. The following results were obtained during the administration of the assessment. Form: C    Cognitive Domain/Subtest: Index Score: Percentile Rank: Classification: IE: Status:   IMMEDIATE MEMORY 81 10%ile LOW AVERAGE 85. 83         1. List Learning (23/40)          2. Story Memory (20/24)           VISUOSPATIAL/  CONSTRUCTIONAL 78 8%ile EXTREMELY LOW 69, 84         3. Figure Copy (10/20)          4. Line Orientation (17/20)           LANGUAGE 96 38%ile  LOW AVERAGE 87, 101         5. Picture Naming (10/10)          6. Semantic Fluency (17/40)           ATTENTION 88 23%ile BORDERLINE 91, 91         7. Digit Span (12/16)          8. Coding (48/89)           DELAYED MEMORY 97 43%ile AVERAGE 97, 97         9. List Recall (5/10)          10. List Recognition (19/20)          11. Story Recall (11/12)          12. Figure Recall (18/20)       Sum of Index Scores:  440 (previously 429)      Total Score:  83      Percentile: 13%ile      Classification: LOW AVERAGE        “IE” indicates the scores from the initial evaluation (8/7/2023).  Form: C    9 HOLE PEG TEST: performed 9 hole peg test to assess dexterity/fine motor coordination with pt scoring 24 seconds (previously 27 seconds) on R hand (affected) and 29 seconds 23 (previously 24 seconds) on L hand (unaffected) side. Functional Dexterity Test    R UE: 31 seconds (previously 34 seconds)  L UE: 36 seconds (previously 31 seconds)    Impairments Section:  UE Strength:              ESTHER: RUE: 88 (previously 78/200) LUE: 84/200 (previously 82/200)               2 point pinch: RUE: 14 (previously 14), LUE: 13 (previously 11)               Lateral Pinch: RUE: 22 (previously 19lbs), LUE: 23 (previously 19lbs)              Three Jaw Otf: RUE: 16 (previously 14) , LUE: 16 (previously 14)                Range of Motion:  AROM:   RUE   -  shoulder flexion  - elbow flexion  -  elbow extension   -   wrist flexion  -  wrist extension    LUE within normal limits     MMT:  R UE: 4+/5 in all pivots - History of shoulder pain  -  shoulder flexion  - elbow flexion  -  elbow extension   -   wrist flexion  -  wrist extension       L UE 5/5 in all pivots   -  shoulder flexion  - elbow flexion  -  elbow extension   -   wrist flexion  -  wrist extension       Pt is R hand dominant    SHORT TERM GOALS:      Pt will demo G recall of 75% of verbal/written information utilizing memory strategy of choice for improved STM/delayed memory - MET    Pt will demo G carryover of use of internal/external memory strategy aides for improved recall of daily events, with 75% accuracy - MET    Pt will increase immediate list memory recall being able to recall > 22/40 items on RBANS - MET    Pt will increase immediate story memory recall being able to recall >22/24 on RBANs - NOT MET    Pt will increase delayed list memory recall being able to recall >5/10 on RBANS - MET    Pt will maintain attention to task for 30 minutes in semi modal environment for baseline performance,  improved role performance and to improve learning and to simulate return to IADLs and complete cooking/cleaning tasks.  - MET    Pt will demo ability to participate in dual tasking/divided attention task with 50% accuracy in multimodal environment to simulate return to life and work roles - [address]    Pt will demo ability to alternate attention between 2 tasks with cog loading and 50% accuracy with G retention of task directions in multimodal environment to simulate return to life and work  roles - [address]    Pt will demo G recall of 90% of verbal/written information utilizing memory strategy of choice for improved STM/delayed memory - MET    Pt will demo G carryover of use of internal/external memory strategy aides for improved recall of daily events, with 90 % accuracy - MET    Pt will increase immediate list memory recall being able to recall > 25/40 items on RBANS - NOT MET    Pt will increase immediate story memory recall being able to recall >24/24 on RBANs - NOT MET    Pt will increase delayed list memory recall being able to recall >7/10 on RBANS - NOT MET    Pt will maintain attention to task for 45 minutes in semi modal environment for baseline performance,  improved role performance and to improve learning and to simulate return to IADLs and complete cooking/cleaning tasks.   - MET    Pt will demo ability to participate in dual tasking/divided attention task with >75% accuracy in multimodal environment to simulate return to life and work roles - NOT MET    Pt will demo ability to alternate attention between 2 tasks with cog loading and >75% accuracy with G retention of task directions in multimodal environment to simulate return to life and work  roles - NOT MET    [address] 90 min for administration, documentation, interpretation, scoring and POC development RBANS    PLANNED THERAPY INTERVENTIONS:  Internal and external memory aides  Hypersensitivity strategies education  Multi-modal environment  Sustained/alternating/divided attention  Temporal Awareness: Organize the Hour activities  Memory and mental manipulation  Auditory processing with immediate recall  Memory retention with immediate and delayed recall  Edu on cog/vision apps

## 2023-08-08 ENCOUNTER — TELEPHONE (OUTPATIENT)
Dept: NEUROLOGY | Facility: CLINIC | Age: 33
End: 2023-08-08

## 2023-08-08 DIAGNOSIS — G35 MULTIPLE SCLEROSIS (HCC): ICD-10-CM

## 2023-08-08 NOTE — TELEPHONE ENCOUNTER
Davian Covington MD 6 hours ago (5:46 AM)       I am ok with generic as an option. The only reason rx was originally on hold was due to issues with compliance getting labs drawn. Please help pt with coverage options. If he no longer can get aubagio brand or generic, he will need to come in for appt for alternative med options covered by his insurance.

## 2023-08-08 NOTE — TELEPHONE ENCOUNTER
HAYLEE Elliott    8/7/23  4:56 PM  Unsigned Note     Good afternoon, this is Kalli Davis calling on behalf of Kuldip Galaviz,  [birth year]. I'm calling in regards to his Aubagio script. It appears that we have exhausted all of our co pay options for assistance and looks like the cost is now going to be close to $2000.00 a month. So I'm just wondering if you guys now have a different program or is it perhaps better just versus a generic? Is that a different option in regards to cost effectiveness just because $2000.00 is not feasible for us to continue on this medication. If you could give me a call back, I'd greatly appreciate it. My number is [phone]. Thank you.  Dani.

## 2023-08-08 NOTE — TELEPHONE ENCOUNTER
Nursing, good options for pt below.   Please see how he would like to proceed and if any new rx needed

## 2023-08-08 NOTE — TELEPHONE ENCOUNTER
CHRISTIANO called [employer] at [phone] and spoke with Collinsezmi. Patient is at his cap of $18,000 for the calendar year. Once patient meets this he must wait for the next calendar year to start, January 2024 to get the funding of $18,000 again. Samir suggested patient could call them and ask to speak to a PAP representative to see if there are any other ways to obtain the medication at a lower cost.  Per Rosemary the generic is not covered by their program.      Miguel K[facility] patient support at [phone] and spoke with [employer]. Per [employer], If patient has met their maximum benefits for the year then Aubagio can not help further. They do not assist with the generic. CHRISTIANO called [employer] at [phone] to inquire about patient assistance programs for Teriflunomide, generic. Per Addi Cabrera patient can get copay assistance for the generic medication. Their copay card will take $20. off the cost through patient's pharmacy benefits pending eligibility. Information for pharmacy line to check cost, sign up for program-   FCYPN#[id number]  Bin# [id number]  ID# [id number]  PCN- [id number]    CHRISTIANO called [facility] w/ [facility] at [phone] and spoke with Tammie Aleman. Patient's copay is $100. for 30 day supply of generic Teriflunomide. Patient was signed up for their copay program in case patient and his provider decide to go this route. Program can take up to $20 off of the copay. Patient does not require a prior auth per Tammie Aleman as he already has a PA on file for the brand of the medication. Next prior auth would be due December 2023. Patient's most recent Aubagio was shipped today so, patient cannot obtain the generic until September 9, 2023. If intending to go ahead with the generic in the future, Tammie Aleman requesting script sent to [facility] [place] for processing. As far as billing for the medication, [facility] would run patient's insurance first, then bill the copay assistance program ([employer]).   Afterwards patient would receive a bill for the medication. It seems as though patient should be able to obtain the generic for aprx $80/30 day supply. SW called patient and left detailed message of above findings and that someone from the clinical team should be calling to discuss and develop a plan moving forward. Please advise if need anything further. Thank you.

## 2023-08-08 NOTE — TELEPHONE ENCOUNTER
Regarding: Aubagio co pay assistance   Contact: [phone]  ----- Message from John Ellis RN sent at 8/7/2023  4:56 PM EDT -----       ----- Message from Bianka Mauro to Brian Malcolm PA-C sent at 8/7/2023 12:18 PM -----   Good afternoon,    I am running into an issue with the copay assistance program regarding exceeding coverage. Is there an alternative option as $2k a month is not feasible.

## 2023-08-08 NOTE — TELEPHONE ENCOUNTER
I am ok with generic as an option. The only reason rx was originally on hold was due to issues with compliance getting labs drawn. Please help pt with coverage options. If he no longer can get aubagio brand or generic, he will need to come in for appt for alternative med options covered by his insurance.

## 2023-08-08 NOTE — TELEPHONE ENCOUNTER
Per below, pt likely able to receive generic teriflunomide for $80 per 30 day supply. Message was left for pt already. If this copay is unacceptable for pt, teriflunomide 14mg is available at [facility] for $10.80/ 30 day supply. [employer] message sent to pt.

## 2023-08-08 NOTE — TELEPHONE ENCOUNTER
Is there something I need to do with this? Looks like Dr. Danielle Tejada was the one involved and I am just getting this sent now.

## 2023-08-12 ENCOUNTER — APPOINTMENT (OUTPATIENT)
Dept: LAB | Facility: MEDICAL CENTER | Age: 33
End: 2023-08-12
Payer: COMMERCIAL

## 2023-08-12 DIAGNOSIS — Z51.81 ENCOUNTER FOR MEDICATION MONITORING: ICD-10-CM

## 2023-08-12 DIAGNOSIS — G35 MULTIPLE SCLEROSIS (HCC): ICD-10-CM

## 2023-08-12 LAB
ALBUMIN SERPL BCP-MCNC: 4.5 G/DL (ref 3.5–5)
ALP SERPL-CCNC: 59 U/L (ref 46–116)
ALT SERPL W P-5'-P-CCNC: 28 U/L (ref 12–78)
AST SERPL W P-5'-P-CCNC: 16 U/L (ref 5–45)
BASOPHILS # BLD AUTO: 0.04 THOUSANDS/ÂΜL (ref 0–0.1)
BASOPHILS NFR BLD AUTO: 1 % (ref 0–1)
BILIRUB DIRECT SERPL-MCNC: 0.25 MG/DL (ref 0–0.2)
BILIRUB SERPL-MCNC: 1.08 MG/DL (ref 0.2–1)
EOSINOPHIL # BLD AUTO: 0.05 THOUSAND/ÂΜL (ref 0–0.61)
EOSINOPHIL NFR BLD AUTO: 1 % (ref 0–6)
ERYTHROCYTE [DISTWIDTH] IN BLOOD BY AUTOMATED COUNT: 12.3 % (ref 11.6–15.1)
HCT VFR BLD AUTO: 47.8 % (ref 36.5–49.3)
HGB BLD-MCNC: 16.1 G/DL (ref 12–17)
IMM GRANULOCYTES # BLD AUTO: 0.01 THOUSAND/UL (ref 0–0.2)
IMM GRANULOCYTES NFR BLD AUTO: 0 % (ref 0–2)
LYMPHOCYTES # BLD AUTO: 1.66 THOUSANDS/ÂΜL (ref 0.6–4.47)
LYMPHOCYTES NFR BLD AUTO: 40 % (ref 14–44)
MCH RBC QN AUTO: 30.1 PG (ref 26.8–34.3)
MCHC RBC AUTO-ENTMCNC: 33.7 G/DL (ref 31.4–37.4)
MCV RBC AUTO: 90 FL (ref 82–98)
MONOCYTES # BLD AUTO: 0.41 THOUSAND/ÂΜL (ref 0.17–1.22)
MONOCYTES NFR BLD AUTO: 10 % (ref 4–12)
NEUTROPHILS # BLD AUTO: 1.98 THOUSANDS/ÂΜL (ref 1.85–7.62)
NEUTS SEG NFR BLD AUTO: 48 % (ref 43–75)
NRBC BLD AUTO-RTO: 0 /100 WBCS
PLATELET # BLD AUTO: 234 THOUSANDS/UL (ref 149–390)
PMV BLD AUTO: 10 FL (ref 8.9–12.7)
PROT SERPL-MCNC: 7.7 G/DL (ref 6.4–8.4)
RBC # BLD AUTO: 5.34 MILLION/UL (ref 3.88–5.62)
WBC # BLD AUTO: 4.15 THOUSAND/UL (ref 4.31–10.16)

## 2023-08-12 PROCEDURE — 36415 COLL VENOUS BLD VENIPUNCTURE: CPT

## 2023-08-12 PROCEDURE — 80076 HEPATIC FUNCTION PANEL: CPT

## 2023-08-12 PROCEDURE — 85025 COMPLETE CBC W/AUTO DIFF WBC: CPT

## 2023-08-13 DIAGNOSIS — G35 MULTIPLE SCLEROSIS (HCC): Primary | ICD-10-CM

## 2023-08-14 ENCOUNTER — TELEPHONE (OUTPATIENT)
Dept: NEUROLOGY | Facility: CLINIC | Age: 33
End: 2023-08-14

## 2023-08-14 NOTE — TELEPHONE ENCOUNTER
----- Message from Jose Blancas MD sent at 8/13/2023  7:36 AM EDT -----  Nursing, Let pt know total bili and direct bili elevated. Ast and alt normal.  Any nausea or gi issues? Any right upper quad sxs? Also slighlty low wbc as well. This has been in the lower normal range for awhile. Would like to do labs monthly to follow trending. Wbc just started below lower limits of normal in May. Question related to [place] or other reason? Question need for hematology eval.  I am ccing pcp. Please send copy of labs to pcp for further eval of lower wbc and elevated bili. Elevated bili may also be related to the aubagio, but lfts good. Would continue with med at this time. Repeat labs monthly for next 6 months. Pt to call 2 days after labs to review to ensure ok to stay on abuagio. Follow up with pcp as well for any other causes for the lower wbc and elevated bili.    Cced Dr Meño Cee as well

## 2023-08-15 ENCOUNTER — NEW PATIENT COMPREHENSIVE (OUTPATIENT)
Dept: URBAN - METROPOLITAN AREA CLINIC 6 | Facility: CLINIC | Age: 33
End: 2023-08-15

## 2023-08-15 DIAGNOSIS — G35: ICD-10-CM

## 2023-08-15 PROCEDURE — 92083 EXTENDED VISUAL FIELD XM: CPT

## 2023-08-15 PROCEDURE — 92004 COMPRE OPH EXAM NEW PT 1/>: CPT

## 2023-08-15 ASSESSMENT — VISUAL ACUITY
OS_SC: 20/20
OD_SC: 20/20

## 2023-08-15 ASSESSMENT — TONOMETRY
OD_IOP_MMHG: 17
OS_IOP_MMHG: 19

## 2023-08-16 NOTE — TELEPHONE ENCOUNTER
Spoke w/pt's spouse Uday Corbett (on consent form). Advised him of results and recommendations below. Spoyse verbalized understanding. States pt is not experiencing any nausea or RUQ pain. Patient did not have a bowel movement for 3 days (Th, F, Sa) which is unusual for him. He did have a BM on Sunday. Spouse also notes pt has increased urinary frequency recently. Denies any recent illness/infection. States pt is often congested due to allergies. Denies any missed doses of Aubagio. Labs sent to PCP.     Dr. Fabián jimenes

## 2023-08-16 NOTE — TELEPHONE ENCOUNTER
Let pt know I want monthly labs and for pt to follow up with pcp for any gi testing if needed. Thanks for update.   Rx for serial labs in emr

## 2023-08-18 RX ORDER — RENAGEL 800 MG/1
14 TABLET ORAL DAILY
Qty: 30 TABLET | Refills: 5 | Status: SHIPPED | OUTPATIENT
Start: 2023-08-18

## 2023-08-18 NOTE — TELEPHONE ENCOUNTER
Spoke with pt. He reports he was able to get his Aubagio and this was fully covered. Plan will only allow for 30 day supply. Asking for additional refills to be sent. Pt did have labs completed this month, results already reviewed in other encounter. Script pended. Please review and sign if agreeable.

## 2023-09-09 ENCOUNTER — APPOINTMENT (OUTPATIENT)
Dept: LAB | Facility: MEDICAL CENTER | Age: 33
End: 2023-09-09
Payer: COMMERCIAL

## 2023-09-09 LAB
ALBUMIN SERPL BCP-MCNC: 4.8 G/DL (ref 3.5–5)
ALP SERPL-CCNC: 55 U/L (ref 34–104)
ALT SERPL W P-5'-P-CCNC: 21 U/L (ref 7–52)
AST SERPL W P-5'-P-CCNC: 18 U/L (ref 13–39)
BASOPHILS # BLD AUTO: 0.05 THOUSANDS/ÂΜL (ref 0–0.1)
BASOPHILS NFR BLD AUTO: 1 % (ref 0–1)
BILIRUB DIRECT SERPL-MCNC: 0.2 MG/DL (ref 0–0.2)
BILIRUB SERPL-MCNC: 1.23 MG/DL (ref 0.2–1)
EOSINOPHIL # BLD AUTO: 0.04 THOUSAND/ÂΜL (ref 0–0.61)
EOSINOPHIL NFR BLD AUTO: 1 % (ref 0–6)
ERYTHROCYTE [DISTWIDTH] IN BLOOD BY AUTOMATED COUNT: 12.4 % (ref 11.6–15.1)
HCT VFR BLD AUTO: 49 % (ref 36.5–49.3)
HGB BLD-MCNC: 16.1 G/DL (ref 12–17)
IMM GRANULOCYTES # BLD AUTO: 0 THOUSAND/UL (ref 0–0.2)
IMM GRANULOCYTES NFR BLD AUTO: 0 % (ref 0–2)
LYMPHOCYTES # BLD AUTO: 1.58 THOUSANDS/ÂΜL (ref 0.6–4.47)
LYMPHOCYTES NFR BLD AUTO: 40 % (ref 14–44)
MCH RBC QN AUTO: 30 PG (ref 26.8–34.3)
MCHC RBC AUTO-ENTMCNC: 32.9 G/DL (ref 31.4–37.4)
MCV RBC AUTO: 91 FL (ref 82–98)
MONOCYTES # BLD AUTO: 0.34 THOUSAND/ÂΜL (ref 0.17–1.22)
MONOCYTES NFR BLD AUTO: 9 % (ref 4–12)
NEUTROPHILS # BLD AUTO: 1.99 THOUSANDS/ÂΜL (ref 1.85–7.62)
NEUTS SEG NFR BLD AUTO: 49 % (ref 43–75)
NRBC BLD AUTO-RTO: 0 /100 WBCS
PLATELET # BLD AUTO: 244 THOUSANDS/UL (ref 149–390)
PMV BLD AUTO: 10.2 FL (ref 8.9–12.7)
PROT SERPL-MCNC: 7.4 G/DL (ref 6.4–8.4)
RBC # BLD AUTO: 5.36 MILLION/UL (ref 3.88–5.62)
WBC # BLD AUTO: 4 THOUSAND/UL (ref 4.31–10.16)

## 2023-09-10 DIAGNOSIS — G35 MULTIPLE SCLEROSIS (HCC): Primary | ICD-10-CM

## 2023-09-11 ENCOUNTER — TELEPHONE (OUTPATIENT)
Dept: NEUROLOGY | Facility: CLINIC | Age: 33
End: 2023-09-11

## 2023-09-11 DIAGNOSIS — G35 MULTIPLE SCLEROSIS (HCC): Primary | ICD-10-CM

## 2023-09-11 NOTE — TELEPHONE ENCOUNTER
Pt needs updated mri c spine with and without contrast.  Please see about helping him to get scheduled. Bun and cr also entered in orders. If any acute changes, can get expedited imaging in er setting. Please schedule fit in appt with anibal or saad due to new sxs.

## 2023-09-11 NOTE — TELEPHONE ENCOUNTER
----- Message from Matheus Ramos MD sent at 9/10/2023  8:04 AM EDT -----  Nursing,Let pt know total bili elevated at 1.23, direct bili ok. Liver enzymes ok. Pt with further decrease of wbc , now at 4.00. ALC and ANC good. Please check with pt any right upper quad issues, any recurrent infections. Sending labs to pcp as well for any additional medical review. For now, continue with aubagio. Rec recheck in one month. Attn Dr Javid Walters, any other reason for decreasing wbc or elevated TB?

## 2023-09-11 NOTE — TELEPHONE ENCOUNTER
Spoke w/pt. Advised him of results and recommendations below. Pt verbalized understanding. He will repeat labs in 1 month. Denies any RUQ issues. Pt reports tingling in fingertips on b/l hands. It is constant. This has been occurring for appx 3-4 weeks. Pt also reports 1-6 episodes of b/l arm tingling/arm heaviness in the past 3-4 wks. No visual changes. Normal eye exam appx 4wks ago. .  No bowel or bladder changes. No recent illness/infection. No other symptoms to report. Aubagio 14mg - no missed doses     Dr. Keke Neil - please advise.

## 2023-09-11 NOTE — TELEPHONE ENCOUNTER
Spoke w/pt. Advised him of note below. Pt verbalized understanding. Called CS w/pt on the line. Spoke w/Ofelia. She will scheduled pt's MRI c-spine. MRI c-spine scheduled for 9/19/23. Earliest availability w/Melisa/Jose is >/= one month. Dr. Jennifer Lora - please advise on fit-in appt availability. Thank you!

## 2023-09-19 ENCOUNTER — HOSPITAL ENCOUNTER (OUTPATIENT)
Dept: RADIOLOGY | Facility: HOSPITAL | Age: 33
Discharge: HOME | End: 2023-09-19
Attending: PSYCHIATRY & NEUROLOGY
Payer: COMMERCIAL

## 2023-09-19 DIAGNOSIS — G35 MULTIPLE SCLEROSIS (HCC): ICD-10-CM

## 2023-09-19 PROCEDURE — A9585 GADOBUTROL INJECTION: HCPCS | Performed by: PSYCHIATRY & NEUROLOGY

## 2023-09-19 PROCEDURE — 72156 MRI NECK SPINE W/O & W/DYE: CPT

## 2023-09-19 PROCEDURE — G1004 CDSM NDSC: HCPCS

## 2023-09-19 RX ORDER — GADOBUTROL 604.72 MG/ML
6 INJECTION INTRAVENOUS
Status: COMPLETED | OUTPATIENT
Start: 2023-09-19 | End: 2023-09-19

## 2023-09-19 RX ADMIN — GADOBUTROL 6 ML: 604.72 INJECTION INTRAVENOUS at 21:51

## 2023-09-22 ENCOUNTER — PATIENT MESSAGE (OUTPATIENT)
Dept: NEUROLOGY | Facility: CLINIC | Age: 33
End: 2023-09-22

## 2023-09-24 ENCOUNTER — TELEPHONE (OUTPATIENT)
Dept: NEUROLOGY | Facility: CLINIC | Age: 33
End: 2023-09-24

## 2023-09-24 NOTE — TELEPHONE ENCOUNTER
Nursing,Called pt this morning. Left message and also called back and reviewed with pt in detail. Rev subtle enh at C5. Pt notes feeling about the same. Pt helped brother in law with movng yesterday. Still with left greater than right tingling and some diff temporarily with fine motor, resolves on own. Pt with occ heaviness/ weighted feeling as well. currently doing ok, ie ate breafast, cleaned stove. Due to enh, rec 3 days of iv steorids. Pt had done in past and tolerated well. Reminded pt of lower wbc of 4.00. No recent infections, no covid exposure. Pt reminded to let us know of any exposures or infections, fever ,etc while on steroids juwan in setting of lower wbc. Per pt neg hiv. Nursing, please help with setting up 3 days of steroids for pt this week, prior at [id number] [facility] in past. Please send mri imaging and my task above to pcp dr Angelita Paredes as he is not in epic. Want to keep pcp in loop re steroids this week and lower threshold for infection with lower wbc. Question any benefit to hematology referral.  Please send question to pcp. Will discuss poss change of imd for future on Friday appt. 0 = independent

## 2023-09-25 DIAGNOSIS — G35 MULTIPLE SCLEROSIS (HCC): Primary | ICD-10-CM

## 2023-09-25 RX ORDER — SODIUM CHLORIDE 9 MG/ML
20 INJECTION, SOLUTION INTRAVENOUS ONCE
Status: CANCELLED | OUTPATIENT
Start: 2023-09-26

## 2023-09-25 NOTE — TELEPHONE ENCOUNTER
[facility] [address]. Spoke w/Juan Manuel. Pt is scheduled for Solumedrol infusions on the following dates/times:    Tuesday, 9/26/23 @ 2:30pm  Wednesday, 9/27/23 @2:30pm  Thursday, 9/28/23 @ 2:30pm    Spoke w/pt. He is agreeable to dates/times above.      Dr. Melodie jimenes

## 2023-09-25 NOTE — TELEPHONE ENCOUNTER
Called [facility] at [phone]. Spoke w/Talat ALVAREZ. Provided codes: [address] [facility], [address], and [id number]. No PA is needed. Reference #[id number]    Spoke w/pt. He would prefer to have infusions W-F if possible. [facility] first choices. Afternoon appts. Has appt w/Dr. Ankur Jovel on Friday, 9/29 @11am.    Therapy plan entered and sent for signature. Will check infusion sites once plan is signed. TT sent.

## 2023-09-26 ENCOUNTER — HOSPITAL ENCOUNTER (OUTPATIENT)
Dept: INFUSION CENTER | Facility: CLINIC | Age: 33
Discharge: HOME | End: 2023-09-26
Payer: COMMERCIAL

## 2023-09-26 VITALS
OXYGEN SATURATION: 99 % | HEART RATE: 84 BPM | DIASTOLIC BLOOD PRESSURE: 81 MMHG | SYSTOLIC BLOOD PRESSURE: 113 MMHG | RESPIRATION RATE: 18 BRPM | TEMPERATURE: 97.5 F

## 2023-09-26 DIAGNOSIS — G35 MULTIPLE SCLEROSIS (HCC): Primary | ICD-10-CM

## 2023-09-26 PROCEDURE — 96365 THER/PROPH/DIAG IV INF INIT: CPT

## 2023-09-26 RX ORDER — SODIUM CHLORIDE 9 MG/ML
20 INJECTION, SOLUTION INTRAVENOUS ONCE
Status: COMPLETED | OUTPATIENT
Start: 2023-09-26 | End: 2023-09-26

## 2023-09-26 RX ORDER — SODIUM CHLORIDE 9 MG/ML
20 INJECTION, SOLUTION INTRAVENOUS ONCE
Status: CANCELLED | OUTPATIENT
Start: 2023-09-28

## 2023-09-26 RX ADMIN — SODIUM CHLORIDE 1000 MG: 0.9 INJECTION, SOLUTION INTRAVENOUS at 14:58

## 2023-09-26 RX ADMIN — SODIUM CHLORIDE 20 ML/HR: 0.9 INJECTION, SOLUTION INTRAVENOUS at 14:58

## 2023-09-26 NOTE — PROGRESS NOTES
Patient tolerated treatment today without complications.  Verified appointment tomorrow and declined print out

## 2023-09-27 ENCOUNTER — HOSPITAL ENCOUNTER (OUTPATIENT)
Dept: INFUSION CENTER | Facility: CLINIC | Age: 33
Discharge: HOME | End: 2023-09-27
Payer: COMMERCIAL

## 2023-09-27 VITALS
DIASTOLIC BLOOD PRESSURE: 81 MMHG | HEART RATE: 111 BPM | TEMPERATURE: 98.8 F | SYSTOLIC BLOOD PRESSURE: 130 MMHG | OXYGEN SATURATION: 98 % | RESPIRATION RATE: 18 BRPM

## 2023-09-27 DIAGNOSIS — G35 MULTIPLE SCLEROSIS (HCC): Primary | ICD-10-CM

## 2023-09-27 PROCEDURE — 96365 THER/PROPH/DIAG IV INF INIT: CPT

## 2023-09-27 RX ORDER — SODIUM CHLORIDE 9 MG/ML
20 INJECTION, SOLUTION INTRAVENOUS ONCE
Status: COMPLETED | OUTPATIENT
Start: 2023-09-27 | End: 2023-09-27

## 2023-09-27 RX ORDER — SODIUM CHLORIDE 9 MG/ML
20 INJECTION, SOLUTION INTRAVENOUS ONCE
Status: CANCELLED | OUTPATIENT
Start: 2023-09-28

## 2023-09-27 RX ADMIN — METHYLPREDNISOLONE SODIUM SUCCINATE 1000 MG: 125 INJECTION INTRAMUSCULAR; INTRAVENOUS at 14:55

## 2023-09-27 RX ADMIN — SODIUM CHLORIDE 20 ML/HR: 0.9 INJECTION, SOLUTION INTRAVENOUS at 14:48

## 2023-09-27 NOTE — PROGRESS NOTES
Patient tolerated his treatment without any adverse reactions.  Next appointment confirmed and he declined avs

## 2023-09-28 ENCOUNTER — HOSPITAL ENCOUNTER (OUTPATIENT)
Dept: INFUSION CENTER | Facility: CLINIC | Age: 33
Discharge: HOME | End: 2023-09-28
Payer: COMMERCIAL

## 2023-09-28 VITALS
TEMPERATURE: 97.9 F | OXYGEN SATURATION: 98 % | HEART RATE: 103 BPM | DIASTOLIC BLOOD PRESSURE: 78 MMHG | SYSTOLIC BLOOD PRESSURE: 136 MMHG | RESPIRATION RATE: 18 BRPM

## 2023-09-28 DIAGNOSIS — G35 MULTIPLE SCLEROSIS (HCC): Primary | ICD-10-CM

## 2023-09-28 PROCEDURE — 96365 THER/PROPH/DIAG IV INF INIT: CPT

## 2023-09-28 RX ORDER — SODIUM CHLORIDE 9 MG/ML
20 INJECTION, SOLUTION INTRAVENOUS ONCE
Status: CANCELLED | OUTPATIENT
Start: 2023-09-28

## 2023-09-28 RX ORDER — SODIUM CHLORIDE 9 MG/ML
20 INJECTION, SOLUTION INTRAVENOUS ONCE
Status: COMPLETED | OUTPATIENT
Start: 2023-09-28 | End: 2023-09-28

## 2023-09-28 RX ADMIN — METHYLPREDNISOLONE SODIUM SUCCINATE 1000 MG: 125 INJECTION INTRAMUSCULAR; INTRAVENOUS at 14:47

## 2023-09-28 RX ADMIN — SODIUM CHLORIDE 20 ML/HR: 0.9 INJECTION, SOLUTION INTRAVENOUS at 14:49

## 2023-09-28 NOTE — PROGRESS NOTES
Treatment complete, offers no complaints. IV discontinued. PT to follow up with neurologist tomorrow. AVS declined.

## 2023-09-29 ENCOUNTER — OFFICE VISIT (OUTPATIENT)
Dept: NEUROLOGY | Facility: CLINIC | Age: 33
End: 2023-09-29
Payer: COMMERCIAL

## 2023-09-29 ENCOUNTER — TELEPHONE (OUTPATIENT)
Dept: NEUROLOGY | Facility: CLINIC | Age: 33
End: 2023-09-29

## 2023-09-29 VITALS
HEIGHT: 70 IN | SYSTOLIC BLOOD PRESSURE: 106 MMHG | HEART RATE: 105 BPM | WEIGHT: 143 LBS | TEMPERATURE: 98.2 F | DIASTOLIC BLOOD PRESSURE: 58 MMHG | BODY MASS INDEX: 20.47 KG/M2

## 2023-09-29 DIAGNOSIS — G35 MULTIPLE SCLEROSIS (HCC): Primary | ICD-10-CM

## 2023-09-29 PROCEDURE — 99214 OFFICE O/P EST MOD 30 MIN: CPT | Performed by: PSYCHIATRY & NEUROLOGY

## 2023-09-29 NOTE — PROGRESS NOTES
Patient ID: Katelin Segal is a 35 y.o. male. Assessment/Plan:    Multiple sclerosis (HCC)  Pt here for neuro follow up  Pt last seen in May  Pt called on Sunday to review all imaging done to date  Pt with sxs of tingling in fingertips as well as tightness of left arm  Pt had mri c spine done with evidence of subtle en of the left parmedian cord at C5  Pt good historian, treated with 3 days of iv steroids this week and brought in for fit in eval  Currently overall good fine dexterity, no drift, strength full  Pt has been on aubagio since time of dx  Pt did have some period of time off med but back on med for past 1.5 yrs with good compliance  Pt able to get med thru drug assistance program due to high cost  Rev with pt possible change in med due to breakthru lesion  Pt has had good efficacy with med to date and reluctant to change  Cbc with low wbc of 4.00 and sl elevated total bili in past 2 labs  Question if lower wbc and total bili related to aubagio  Pt with known jcv positivity on oct 2022 labs  anc and alc ok  Pt unsure of changing  Rev options ie tecfidera, vumerity, kesimpta  Likely would need wash out prior to start of new med  Pt also interested in staying on aubagio with closer mri imaging  Pt already has imaging scheduled for surviellance in nov  Pt will check about meds on formulary with his insurance as coverage big issue  Also added immunoglobulin testing as well  Pt to have updated labs in Oct and call for review 2 days after labs  Low threshold to change med if pt able to get coverage  Pt and significant other to check on coverage options in meanwhile for labs       Diagnoses and all orders for this visit:    Multiple sclerosis ([address])           Subjective:    HPI  Pt here today for neuro follow up. Pt last seen in 5/15/23 by me.  Per my last note from clinic on 11/14/22 by Dr. Shelli Calzada and Dr. Keo Iverson.     The following was copied from prior notes due to complexity:  "To review, he was initially seen in Sept 2016 for eval of newly diagnosed MS. Patient had symptoms starting in April 2016 with some issues with his speech. He notes he would stop in the middle of the sentence, or have slurring of his speech. PCP ordered MRI and labs. Patient notes first MRI was done without contrast, and he was brought back in for contrasted study. Patient notes he was told by PCP that he likely had MS and was sent to [facility] neurology. He was seen in May 2016 by [facility] neurology and had additional studies including MRI c-spine and t-spine. Patient notes he was told he was going to start Tysabri, but no discussion of the med. When he researched the med on his own, he had concerns about taking the med as his first line therapy. MRI brain 4/2016 no acute infarct. Multiple primarily sub centimeter FLAIR hyperintense lesions present within the subcortical WM, juxtacortical location and deep PV white matter. Additionally a single small lesion present within the splenium of the corpus callosum. At least one of the lesions has a somewhat Robert’s finger-like morphology. The totally number of lesions is approximately 15 with the largest at 12mm within the subcortical WM of the left parietal lobe. Although non-specific, given the number and location of the lesions, most concerning for demyelinating process. Patient went back with contrast and there were several areas of enhancement notes within the subcortical WM of the frontal lobes bilaterally, left parietal, and madeline temporal lobes. MRI t-spine done 6/3/16 no cord lesions. MRI c-spine 6/3/16 no clear evidence of focal cord demyelination, mild DDD, no spinal stenosis. Question of artifact on sagittal films. JCV negative with index of 0.17, done on Oct 17, 2016. Patient started Nika Human as his first medication in late 2016. Patient returned to our office after 5 years absence. He reported he stopped Aubagio in December 2018.  He felt he was doing well and did not feel he needed medication or follow up. He reported in the months leading up to returning to our office, he did notice some new symptoms including right-sided weakness, right hand tingling, occasional blurry vision. Following his visit to re-establish care, he had updated imaging completed. MRI brain 2/24/2022 compared to 4/2016. Significant disease progression noted including at least 10 enhancing supratentorial lesions. MRI c-spine 2/24/2022 compared to 6/2016. Multiple white matter lesions scattered throughout cervical spinal cord and possibly upper thoracic cord, new since prior exam.  No enhancing cord lesions. Patient was called with these results and IV steroids were offered. He completed 3 days of IV steroids 3/2, 3/3 and 3/4, overall tolerated well. Labs completed 2/19/22. Vit D low at 16. Neg Lyme, Sjogren's, Quantiferon Gold TB. JCV now positive with index 0.87. Normal CBC and CMP. Patient was seen on 3/25/22 and we discussed re-initiating DMT due to evidence of disease progression on imaging.  He wanted to re-trial Aubagio, and start form was signed. At last visit, noted several symptoms. Recently had had shingles. Was having difficulty with vision adjusting to the dark after being in the light. Had never been to an Ophthalmologist. Was evaluated by Neuropsychology and noted to have intact cognitive functioning but concern for depression. Doing well on Aubagio."    Kept above due to complexities of pt case. Pt last seen in May. Since last visit pt had been doing well until this past month. Pt had called re tingling in finger tips and tightness of the left arm. Of note, in past, pt had most of his issues with his right side. sxs on the left were new. Pt had c spine mri imaging done on 9/19/23- "Progression of chronic demyelinating disease within the cervical cord in particular at the C4 and C5 levels.  Subtle enhancement of a left paramedian cord lesion at the level of the C5 superior endplate suggests active inflammation/demyelination. Stable minor cervical degenerative change  Rev with pt at time of radiology read of study and new finding correlates with pt sxs. Due to symptomatic findings, long discussion re pros/ cons of iv steroids. Based on cord sxs, pt had 3 days of iv steroids fit in this week. Pt just completed 3 day course yesterday. Pt notes improved dexterity of the left hand. Pt notes improved tightness as well. Feeling much better. No falls or trips. No loc. No sz. No change in vision. No loss of vision. No change in speech or swallowing. No change in bowel or bladder. No recent hospitalization. No recent infections. Rev labs in detail on Sunday and again today at appt. Pt with labsf rom 9/9- wbc 4.00 , alc at 1.58, anc 1.99.  ast 18, alt 21. Pt with slightly elevated total bili 1.23. Also slightly elevated total bili in aug. Pt notes no right upper quad sxs. Looking back to jcv postive from feb 2022. Pt due for upcoming sleep eval as well this month. Rev with pt only imd med to date has been [place]. Some issues with compliance initially but pt has done well with med over past 1.5 yrs. Due to breakthru lesion, discussed with pt possible change in med to vumerity, tecfidera or kesimpta. Need to be mindful of pos jcv as well as lower wbc,  Question if both lower wbc and total bili abn related to [place]. Before washout of aubagio and new med, pt questioning if he can stay on aubagio as cost covered and unsure about other meds. Pt and significant other to look into these other meds from formulary. Both think that another med may also be covered. Pt will also get updated labs and immunoglobulins done in meanwhile. Pt also asking about closer surveillance with imaging before going off aubagio. This is option but due to new lesion, may be best to change imds. Pt will look into formulary options of med above.  Pt also already in for surviellance imaging in Nov.  Pt to call 2 days after labs to review med options.             The following portions of the patient's history were reviewed and updated as appropriate: allergies, current medications, past family history, past medical history, past social history, past surgical history and problem list.         Objective:    Blood pressure 106/58, pulse 105, temperature 98.2 °F (36.8 °C), height 5' 10" (1.778 m), weight 64.9 kg (143 lb). Physical Exam  Constitutional:       General: He is not in acute distress. Appearance: He is not ill-appearing. Eyes:      General: Lids are normal.      Extraocular Movements: Extraocular movements intact. Pupils: Pupils are equal, round, and reactive to light. Musculoskeletal:      Right lower leg: No edema. Left lower leg: No edema. Neurological:      Mental Status: He is alert. Motor: Motor strength is normal.     Deep Tendon Reflexes: Reflexes are normal and symmetric. Psychiatric:         Speech: Speech normal.         Neurological Exam  Mental Status  Alert. Able to copy figure. Speech is normal. Language is fluent with no aphasia. Fund of knowledge is appropriate for level of education. Cranial Nerves  CN II: Visual acuity is normal. Visual fields full to confrontation. CN III, IV, VI: Extraocular movements intact bilaterally. Normal lids and orbits bilaterally. Pupils equal round and reactive to light bilaterally. CN V: Facial sensation is normal.  CN VII: Full and symmetric facial movement. CN VIII: Hearing is normal.  CN IX, X: Palate elevates symmetrically. Normal gag reflex. CN XI: Shoulder shrug strength is normal.  CN XII: Tongue midline without atrophy or fasciculations. Motor  Normal muscle bulk throughout. Normal muscle tone. No abnormal involuntary movements. Strength is 5/5 throughout all four extremities. Sensory  Sensation is intact to light touch, pinprick, vibration and proprioception in all four extremities.     Reflexes  Deep tendon reflexes are 2+ and symmetric in all four extremities. Coordination  Right: Finger-to-nose normal. Rapid alternating movement normal.Left: Finger-to-nose normal. Heel-to-shin normal.    Gait  Casual gait is normal including stance, stride, and arm swing. ROS:    Review of Systems   Constitutional: Negative for appetite change, fatigue and fever. HENT: Negative. Negative for hearing loss, tinnitus, trouble swallowing and voice change. Eyes: Negative. Negative for photophobia, pain and visual disturbance. Respiratory: Negative. Negative for shortness of breath. Cardiovascular: Negative. Negative for palpitations. Gastrointestinal: Negative. Negative for nausea and vomiting. Endocrine: Negative. Negative for cold intolerance. Genitourinary: Negative. Negative for dysuria, frequency and urgency. Musculoskeletal: Negative for back pain, gait problem, myalgias and neck pain. Skin: Negative. Negative for rash. Allergic/Immunologic: Negative. Neurological: Negative. Negative for dizziness, tremors, seizures, syncope, facial asymmetry, speech difficulty, weakness, light-headedness, numbness and headaches. Hematological: Negative. Does not bruise/bleed easily. Psychiatric/Behavioral: Negative. Negative for confusion, hallucinations and sleep disturbance. All other systems reviewed and are negative.

## 2023-09-29 NOTE — TELEPHONE ENCOUNTER
Please follow up with pt to see if he has check on any other med options on formulary with his insurance ie vumerity, tecfidera or kesimpta.

## 2023-09-29 NOTE — ASSESSMENT & PLAN NOTE
Pt here for neuro follow up  Pt last seen in May  Pt called on Sunday to review all imaging done to date  Pt with sxs of tingling in fingertips as well as tightness of left arm  Pt had mri c spine done with evidence of subtle en of the left parmedian cord at C5  Pt good historian, treated with 3 days of iv steroids this week and brought in for fit in eval  Currently overall good fine dexterity, no drift, strength full  Pt has been on aubagio since time of dx  Pt did have some period of time off med but back on med for past 1.5 yrs with good compliance  Pt able to get med thru drug assistance program due to high cost  Rev with pt possible change in med due to breakthru lesion  Pt has had good efficacy with med to date and reluctant to change  Cbc with low wbc of 4.00 and sl elevated total bili in past 2 labs  Question if lower wbc and total bili related to aubagio  Pt with known jcv positivity on oct 2022 labs  anc and alc ok  Pt unsure of changing  Rev options ie tecfidera, vumerity, kesimpta  Likely would need wash out prior to start of new med  Pt also interested in staying on aubagio with closer mri imaging  Pt already has imaging scheduled for surviellance in nov  Pt will check about meds on formulary with his insurance as coverage big issue  Also added immunoglobulin testing as well  Pt to have updated labs in Oct and call for review 2 days after labs  Low threshold to change med if pt able to get coverage  Pt and significant other to check on coverage options in meanwhile for labs

## 2023-10-01 ENCOUNTER — PATIENT MESSAGE (OUTPATIENT)
Dept: NEUROLOGY | Facility: CLINIC | Age: 33
End: 2023-10-01

## 2023-10-02 ENCOUNTER — TELEPHONE (OUTPATIENT)
Dept: NEUROLOGY | Facility: CLINIC | Age: 33
End: 2023-10-02

## 2023-10-02 NOTE — TELEPHONE ENCOUNTER
October 1, 2023  Tai Martin  to [facility] Neurology [facility] Clinical Team 3 (supporting Aleks Pastor MD)          10/1/23  7:48 PM  Good evening Kierra Uribe. I just wanted to keep you posted. Last night I had a symptom flare up again, going down my left arm. Came out of nowhere. It only lasted maybe 15-20 minutes, but just wanted to keep you updated. I know the steroids don’t work overnight. Spoke w/pt. He will go for labwork on Saturday. Patient reports he feels very dizzy, lightheaded, fatigued. Admits to sleep deprivation the past few days. Disappointed that he did not get a boost of "feeling great" from Solumedrol like he did previously. Went over Jeff w/pt and .     Kesimpta Tier 2   Tecfidera NF (non-formulary) will be denied  Dimethyl fumarate Tier 1  Vumerity Tier 2 preferred brand (if denied x 2, pt would be eligible for free drug thru Dony[facility] Newman)    Dr. Grupo jimenes

## 2023-10-02 NOTE — TELEPHONE ENCOUNTER
Please check with pt if any issues with dexterity currently in the left arm. Likely still from known lesion. Lightheadedness likely not related to this lesion. There is rx in emr already for mri head to be done. When is this scheduled? ? Any vertigo? Or n or v? Any diplopia. Pt just seen this past Friday. , ie 3 days ago. No focality then. If any new sxs, would need to go to er. Im thinking dimethyl fumarate and kesimpta as main options for next imd. Awaiting next set of labs to help direct which one to go with. Please have pt call us 2 days after mri head and 2 days after updated labs.

## 2023-10-03 NOTE — TELEPHONE ENCOUNTER
Left detailed msg (per consent in chart) regarding note below. MRI brain scheduled for 11/14/23    Labs to be done on Saturday, 10/7/23.

## 2023-10-05 NOTE — TELEPHONE ENCOUNTER
October 3, 2023  Marion Linn  to [facility] Neurology [address] Clinical Team 3 (supporting Sudha Cox MD)          10/3/23  2:13 PM  Just want to keep you informed on the reoccurrence of these symptoms.       This morning Crystal Ports woke up and had the feeling down both arms. It subsided a bit quicker than it has in the past.       However during lunch he felt the symptom coming on again in his left arm.       Is there anything else that could be tried to eliminate the symptoms? I understand that the infusions can take 6 weeks I just would like to ensure we are crossing all avenues as I don’t like seeing him like this.       Thanks!    Regan

## 2023-10-05 NOTE — TELEPHONE ENCOUNTER
Spoke w/pt. He states he is feeling better. He does not feel as "disconnected." Still very fatigued. Labs will be done Saturday, 10/7/23.

## 2023-10-07 ENCOUNTER — APPOINTMENT (OUTPATIENT)
Dept: LAB | Facility: MEDICAL CENTER | Age: 33
End: 2023-10-07
Payer: COMMERCIAL

## 2023-10-07 DIAGNOSIS — G35 MULTIPLE SCLEROSIS (HCC): ICD-10-CM

## 2023-10-07 LAB
BUN SERPL-MCNC: 17 MG/DL (ref 5–25)
CREAT SERPL-MCNC: 0.89 MG/DL (ref 0.6–1.3)
GFR SERPL CREATININE-BSD FRML MDRD: 112 ML/MIN/1.73SQ M
IGA SERPL-MCNC: 175 MG/DL (ref 66–433)
IGG SERPL-MCNC: 861 MG/DL (ref 635–1741)
IGM SERPL-MCNC: 79 MG/DL (ref 45–281)

## 2023-10-07 PROCEDURE — 36415 COLL VENOUS BLD VENIPUNCTURE: CPT

## 2023-10-07 PROCEDURE — 84520 ASSAY OF UREA NITROGEN: CPT

## 2023-10-07 PROCEDURE — 82784 ASSAY IGA/IGD/IGG/IGM EACH: CPT

## 2023-10-07 PROCEDURE — 82565 ASSAY OF CREATININE: CPT

## 2023-10-08 DIAGNOSIS — G35 MULTIPLE SCLEROSIS (HCC): Primary | ICD-10-CM

## 2023-10-09 ENCOUNTER — TELEPHONE (OUTPATIENT)
Dept: NEUROLOGY | Facility: CLINIC | Age: 33
End: 2023-10-09

## 2023-10-09 NOTE — TELEPHONE ENCOUNTER
----- Message from Jr López MD sent at 10/8/2023 10:22 AM EDT -----  Let pt know total bili now ok. However now his alt is elevated at 76. Pt has never had elevated lfts in past.  We did give iv steroids recently. Not sure if related, as steroids should be out of system. Please see if any other new meds or increase in over the counter meds recently? He will need recheck of labs in one week, rather than waithing monthly. If continued elevation, will likely need to stop the aubagio and start wash out for another new med. Need to make sure liver function ok before considering any other new imd as well. Cbc diff ok, wbc ok at 5.24, alc ok at 1.99, and anc ok at 2.60. I am putting in new rx for lfts in one week. Pt to call us in one week after lab for next set of directions.

## 2023-10-09 NOTE — TELEPHONE ENCOUNTER
Spoke w/pt. Advised him of results and recommendations below. Pt verbalized understanding. No new meds. No increase in OTC meds. Pt will repeat labs in one week.     Dr. Macy jimenes

## 2023-10-13 ENCOUNTER — TELEPHONE (OUTPATIENT)
Dept: PSYCHIATRY | Facility: CLINIC | Age: 33
End: 2023-10-13

## 2023-10-13 NOTE — TELEPHONE ENCOUNTER
DISCHARGE LETTER for Christi Jimenes (certified) placed in outgoing mail on 10/17/2023    Article #:  [id number]    Address:  [address] [place] [address]

## 2023-10-14 ENCOUNTER — APPOINTMENT (OUTPATIENT)
Dept: LAB | Facility: MEDICAL CENTER | Age: 33
End: 2023-10-14
Payer: COMMERCIAL

## 2023-10-14 DIAGNOSIS — G35 MULTIPLE SCLEROSIS (HCC): ICD-10-CM

## 2023-10-14 LAB
ALBUMIN SERPL BCP-MCNC: 4.7 G/DL (ref 3.5–5)
ALP SERPL-CCNC: 56 U/L (ref 34–104)
ALT SERPL W P-5'-P-CCNC: 29 U/L (ref 7–52)
AST SERPL W P-5'-P-CCNC: 19 U/L (ref 13–39)
BASOPHILS # BLD AUTO: 0.05 THOUSANDS/ÂΜL (ref 0–0.1)
BASOPHILS NFR BLD AUTO: 1 % (ref 0–1)
BILIRUB DIRECT SERPL-MCNC: 0.22 MG/DL (ref 0–0.2)
BILIRUB SERPL-MCNC: 1.26 MG/DL (ref 0.2–1)
EOSINOPHIL # BLD AUTO: 0.06 THOUSAND/ÂΜL (ref 0–0.61)
EOSINOPHIL NFR BLD AUTO: 1 % (ref 0–6)
ERYTHROCYTE [DISTWIDTH] IN BLOOD BY AUTOMATED COUNT: 12.2 % (ref 11.6–15.1)
HCT VFR BLD AUTO: 46.4 % (ref 36.5–49.3)
HGB BLD-MCNC: 15.9 G/DL (ref 12–17)
IMM GRANULOCYTES # BLD AUTO: 0.01 THOUSAND/UL (ref 0–0.2)
IMM GRANULOCYTES NFR BLD AUTO: 0 % (ref 0–2)
LYMPHOCYTES # BLD AUTO: 1.8 THOUSANDS/ÂΜL (ref 0.6–4.47)
LYMPHOCYTES NFR BLD AUTO: 35 % (ref 14–44)
MCH RBC QN AUTO: 31 PG (ref 26.8–34.3)
MCHC RBC AUTO-ENTMCNC: 34.3 G/DL (ref 31.4–37.4)
MCV RBC AUTO: 90 FL (ref 82–98)
MONOCYTES # BLD AUTO: 0.48 THOUSAND/ÂΜL (ref 0.17–1.22)
MONOCYTES NFR BLD AUTO: 9 % (ref 4–12)
NEUTROPHILS # BLD AUTO: 2.75 THOUSANDS/ÂΜL (ref 1.85–7.62)
NEUTS SEG NFR BLD AUTO: 54 % (ref 43–75)
NRBC BLD AUTO-RTO: 0 /100 WBCS
PLATELET # BLD AUTO: 222 THOUSANDS/UL (ref 149–390)
PMV BLD AUTO: 10.1 FL (ref 8.9–12.7)
PROT SERPL-MCNC: 7.2 G/DL (ref 6.4–8.4)
RBC # BLD AUTO: 5.13 MILLION/UL (ref 3.88–5.62)
WBC # BLD AUTO: 5.15 THOUSAND/UL (ref 4.31–10.16)

## 2023-10-14 PROCEDURE — 85025 COMPLETE CBC W/AUTO DIFF WBC: CPT

## 2023-10-14 PROCEDURE — 80076 HEPATIC FUNCTION PANEL: CPT

## 2023-10-14 PROCEDURE — 36415 COLL VENOUS BLD VENIPUNCTURE: CPT

## 2023-10-15 DIAGNOSIS — G35 MULTIPLE SCLEROSIS (HCC): Primary | ICD-10-CM

## 2023-10-16 NOTE — TELEPHONE ENCOUNTER
Spoke w/py. Advised him of results and recommendations below. Pt verbalized understanding. He will f/u w/PCP. Pt's spouse notes pt's direct and total bilirubin values have been elevated in the past (see 5/23, 8/23 lab results). Labs sent to PCP. Patient has not been seen by GI. Patient asking if MRI brain scheduled for 11/15/23 needs to be moved up. Dr. Ankur oJvel - please advise on MRI timing.

## 2023-10-16 NOTE — TELEPHONE ENCOUNTER
Bunny Paz MD  P Neurology [facility] Clinical Team 3  Let pt know total bili and direct bili elevated, needs follow up with pcp. Send labs to pcp. Does he see gi? Ast and alt back to normal.  Specifically alt good at 29. Cbc diff ok as well. Alc and anc ok. Would repeat levels in 2 weeks if lfts remain ok, we can then consider washing out the aubagio and starting dimethyl fumarate. From prior note, looks like this med is tier 1 for pt. He would have to complete 11 day washout with cholestyramine and then check aubagio level before starting new med. Rx for labs in 2 weeks in emr.

## 2023-10-18 ENCOUNTER — OFFICE VISIT (OUTPATIENT)
Age: 33
End: 2023-10-18
Payer: COMMERCIAL

## 2023-10-18 VITALS
HEIGHT: 70 IN | SYSTOLIC BLOOD PRESSURE: 114 MMHG | OXYGEN SATURATION: 99 % | TEMPERATURE: 98.3 F | DIASTOLIC BLOOD PRESSURE: 80 MMHG | HEART RATE: 97 BPM | BODY MASS INDEX: 19.47 KG/M2 | WEIGHT: 136 LBS

## 2023-10-18 DIAGNOSIS — G47.8 UNREFRESHED BY SLEEP: ICD-10-CM

## 2023-10-18 DIAGNOSIS — G47.00 FREQUENT NOCTURNAL AWAKENING: ICD-10-CM

## 2023-10-18 DIAGNOSIS — G47.26 SHIFT WORK SLEEP DISORDER: ICD-10-CM

## 2023-10-18 DIAGNOSIS — G47.09 OTHER INSOMNIA: Primary | ICD-10-CM

## 2023-10-18 DIAGNOSIS — G35 MULTIPLE SCLEROSIS (HCC): ICD-10-CM

## 2023-10-18 PROCEDURE — 99204 OFFICE O/P NEW MOD 45 MIN: CPT | Performed by: INTERNAL MEDICINE

## 2023-10-18 NOTE — PROGRESS NOTES
Assessment/Plan:     Diagnoses and all orders for this visit:    Other insomnia  -     Diagnostic Sleep Study; Future    Multiple sclerosis ([address])  -     Ambulatory Referral to Sleep Medicine  -     Diagnostic Sleep Study; Future    Frequent nocturnal awakening  -     Ambulatory Referral to Sleep Medicine  -     Diagnostic Sleep Study; Future    Unrefreshed by sleep    Shift work sleep disorder          Plan for follow up:  Patient with history of multiple sclerosis on treatment, with history of sleep problems, with mainly sleep maintenance insomnia,, ? Secondary to pain from the multiple sclerosis, also with likely shift work disorder, he was on night shifts for 14 years in the past  Discussed regarding continuing with Advil PM, also he takes melatonin occasionally 4 mg to continue as needed as needed  Discussed regarding getting a diagnostic sleep study  Testing procedure was discussed  Also discussed regarding getting the sleep logs, diary sleep diary was given to the patient  Sleep hygiene discussed with the patient understands and verbalizes  Cautioned against driving when sleepy. Follow-up after the above study. No follow-ups on file. All questions are answered to the patient's satisfaction and understanding. He verbalizes understanding. He is encouraged to call with any further questions or concerns. Portions of the record may have been created with voice recognition software. Occasional wrong word or "sound a like" substitutions may have occurred due to the inherent limitations of voice recognition software. Read the chart carefully and recognize, using context, where substitutions have occurred. a    Electronically Signed by Rafael Chaves MD    ______________________________________________________________________    Chief Complaint:   Chief Complaint   Patient presents with    Sleep Apnea        Patient ID: Shu Burnett is a 35 y.o. y.o. male has a past medical history of Multiple sclerosis ([address]) and Sleep apnea, obstructive. 10/18/2023  Patient presents today for initial visit. Leo Garcia is a very pleasant 27-year-old gentleman, being referred from neurology for evaluation for his sleep problems. 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 he does commute 4 days in a week does have a commute time of about 20 to 25 minutes to work, 1 day a week he works from home he states, his daily sleep schedule currently is he goes to bed at around 9 PM test takes about more than an hour to fall asleep , sometimes does take Advil PM and when he takes the Advil PM can fall back asleep in half an hour he has an alarm for 6 AM he states, he has 3-5 nocturnal awakenings does not know the cause cannot fall back asleep most of the time especially during the 2 to 3 AM timeframe, when he is out of the bed he does feel well rested but after that later during the day he feels very tired does not take any naps during the day. He does have early morning headaches he attributes it for sinus headaches, he states he does not have any anxiety issues. Occupational/Exposure history:  Pets/Enviroment:dogs  Travel history:none  Review of Systems   Constitutional:  Positive for fatigue. HENT: Negative. Eyes: Negative. Respiratory: Negative. Soft snoring, only in the supine position, no witnessed apneic spells no choking or gasping for air. Cardiovascular: Negative. Gastrointestinal: Negative. Endocrine: Negative. Genitourinary: Negative. Musculoskeletal:  Positive for myalgias. Neurological:  Positive for headaches. Hematological: Negative. Psychiatric/Behavioral:  Positive for sleep disturbance. Social history: He reports that he has never smoked.  He has never used smokeless tobacco. He reports that he does not use drugs. Past surgical history:   Past Surgical History:   Procedure Laterality Date    WISDOM TOOTH EXTRACTION       Family history:   Family History   Problem Relation Age of Onset    Cancer Father     Multiple sclerosis Maternal Aunt          There is no immunization history on file for this patient. Current Outpatient Medications   Medication Sig Dispense Refill    Aubagio 14 MG TABS Take 1 tablet (14 mg total) by mouth in the morning 30 tablet 5    cholecalciferol (VITAMIN D3) 1,000 units tablet Take 1,000 Units by mouth daily      Multiple Vitamins-Minerals (multivitamin with minerals) tablet Take 1 tablet by mouth daily         No current facility-administered medications for this visit. Allergies: Patient has no known allergies. Objective:  Vitals:    10/18/23 1419   BP: 114/80   Pulse: 97   Temp: 98.3 °F (36.8 °C)   SpO2: 99%   Weight: 61.7 kg (136 lb)   Height: 5' 10" (1.778 m)   Oxygen Therapy  SpO2: 99 %  . Wt Readings from Last 3 Encounters:   10/18/23 61.7 kg (136 lb)   09/29/23 64.9 kg (143 lb)   05/15/23 61.2 kg (135 lb)     Body mass index is 19.51 kg/m². Physical Exam  Vitals and nursing note reviewed. Constitutional:       Appearance: He is well-developed. HENT:      Head: Normocephalic and atraumatic. Eyes:      Conjunctiva/sclera: Conjunctivae normal.      Pupils: Pupils are equal, round, and reactive to light. Neck:      Thyroid: No thyromegaly. Vascular: No JVD. Cardiovascular:      Rate and Rhythm: Normal rate and regular rhythm. Heart sounds: Normal heart sounds. No murmur heard. No friction rub. No gallop. Pulmonary:      Effort: Pulmonary effort is normal. No respiratory distress. Breath sounds: Normal breath sounds. No wheezing or rales. Chest:      Chest wall: No tenderness. Abdominal:      Palpations: Abdomen is soft. Musculoskeletal:         General: No tenderness or deformity.  Normal range of motion. Cervical back: Normal range of motion and neck supple. Lymphadenopathy:      Cervical: No cervical adenopathy. Skin:     General: Skin is warm and dry. Neurological:      Mental Status: He is alert and oriented to person, place, and time.          Lab Review:   Appointment on 10/14/2023   Component Date Value    WBC 10/14/2023 5.15     RBC 10/14/2023 5.13     Hemoglobin 10/14/2023 15.9     Hematocrit 10/14/2023 46.4     MCV 10/14/2023 90     MCH 10/14/2023 31.0     MCHC 10/14/2023 34.3     RDW 10/14/2023 12.2     MPV 10/14/2023 10.1     Platelets 28/36/9885 222     nRBC 10/14/2023 0     Neutrophils Relative 10/14/2023 54     Immat GRANS % 10/14/2023 0     Lymphocytes Relative 10/14/2023 35     Monocytes Relative 10/14/2023 9     Eosinophils Relative 10/14/2023 1     Basophils Relative 10/14/2023 1     Neutrophils Absolute 10/14/2023 2.75     Immature Grans Absolute 10/14/2023 0.01     Lymphocytes Absolute 10/14/2023 1.80     Monocytes Absolute 10/14/2023 0.48     Eosinophils Absolute 10/14/2023 0.06     Basophils Absolute 10/14/2023 0.05     Total Bilirubin 10/14/2023 1.26 (H)     Bilirubin, Direct 10/14/2023 0.22 (H)     Alkaline Phosphatase 10/14/2023 56     AST 10/14/2023 19     ALT 10/14/2023 29     Total Protein 10/14/2023 7.2     Albumin 10/14/2023 4.7    Appointment on 10/07/2023   Component Date Value    BUN 10/07/2023 17     Creatinine 10/07/2023 0.89     eGFR 10/07/2023 112     IGA 10/07/2023 175     IGG 10/07/2023 861     IGM 10/07/2023 79    Ancillary Orders on 10/06/2023   Component Date Value    Total Bilirubin 10/07/2023 0.93     Bilirubin, Direct 10/07/2023 0.10     Alkaline Phosphatase 10/07/2023 57     AST 10/07/2023 32     ALT 10/07/2023 76 (H)     Total Protein 10/07/2023 7.2     Albumin 10/07/2023 4.6     WBC 10/07/2023 5.24     RBC 10/07/2023 5.24     Hemoglobin 10/07/2023 15.9     Hematocrit 10/07/2023 48.0     MCV 10/07/2023 92     MCH 10/07/2023 30.3     MCHC 10/07/2023 33.1     RDW 10/07/2023 12.2     MPV 10/07/2023 10.0     Platelets 42/16/7741 240     nRBC 10/07/2023 0     Neutrophils Relative 10/07/2023 50     Immat GRANS % 10/07/2023 0     Lymphocytes Relative 10/07/2023 38     Monocytes Relative 10/07/2023 10     Eosinophils Relative 10/07/2023 1     Basophils Relative 10/07/2023 1     Neutrophils Absolute 10/07/2023 2.60     Immature Grans Absolute 10/07/2023 0.02     Lymphocytes Absolute 10/07/2023 1.99     Monocytes Absolute 10/07/2023 0.53     Eosinophils Absolute 10/07/2023 0.06     Basophils Absolute 10/07/2023 0.04    Orders Only on 08/13/2023   Component Date Value    Total Bilirubin 09/09/2023 1.23 (H)     Bilirubin, Direct 09/09/2023 0.20     Alkaline Phosphatase 09/09/2023 55     AST 09/09/2023 18     ALT 09/09/2023 21     Total Protein 09/09/2023 7.4     Albumin 09/09/2023 4.8     WBC 09/09/2023 4.00 (L)     RBC 09/09/2023 5.36     Hemoglobin 09/09/2023 16.1     Hematocrit 09/09/2023 49.0     MCV 09/09/2023 91     MCH 09/09/2023 30.0     MCHC 09/09/2023 32.9     RDW 09/09/2023 12.4     MPV 09/09/2023 10.2     Platelets 32/92/3908 244     nRBC 09/09/2023 0     Neutrophils Relative 09/09/2023 49     Immat GRANS % 09/09/2023 0     Lymphocytes Relative 09/09/2023 40     Monocytes Relative 09/09/2023 9     Eosinophils Relative 09/09/2023 1     Basophils Relative 09/09/2023 1     Neutrophils Absolute 09/09/2023 1.99     Immature Grans Absolute 09/09/2023 0.00     Lymphocytes Absolute 09/09/2023 1.58     Monocytes Absolute 09/09/2023 0.34     Eosinophils Absolute 09/09/2023 0.04     Basophils Absolute 09/09/2023 0.05    Appointment on 08/12/2023   Component Date Value    WBC 08/12/2023 4.15 (L)     RBC 08/12/2023 5.34     Hemoglobin 08/12/2023 16.1     Hematocrit 08/12/2023 47.8     MCV 08/12/2023 90     MCH 08/12/2023 30.1     MCHC 08/12/2023 33.7     RDW 08/12/2023 12.3     MPV 08/12/2023 10.0     Platelets 96/90/0172 234     nRBC 08/12/2023 0 Neutrophils Relative 08/12/2023 48     Immat GRANS % 08/12/2023 0     Lymphocytes Relative 08/12/2023 40     Monocytes Relative 08/12/2023 10     Eosinophils Relative 08/12/2023 1     Basophils Relative 08/12/2023 1     Neutrophils Absolute 08/12/2023 1.98     Immature Grans Absolute 08/12/2023 0.01     Lymphocytes Absolute 08/12/2023 1.66     Monocytes Absolute 08/12/2023 0.41     Eosinophils Absolute 08/12/2023 0.05     Basophils Absolute 08/12/2023 0.04     Total Bilirubin 08/12/2023 1.08 (H)     Bilirubin, Direct 08/12/2023 0.25 (H)     Alkaline Phosphatase 08/12/2023 59     AST 08/12/2023 16     ALT 08/12/2023 28     Total Protein 08/12/2023 7.7     Albumin 08/12/2023 4.5    Appointment on 05/06/2023   Component Date Value    Total Bilirubin 05/06/2023 0.72     Bilirubin, Direct 05/06/2023 0.14     Alkaline Phosphatase 05/06/2023 64     AST 05/06/2023 15     ALT 05/06/2023 24     Total Protein 05/06/2023 7.6     Albumin 05/06/2023 4.4     WBC 05/06/2023 4.14 (L)     RBC 05/06/2023 5.46     Hemoglobin 05/06/2023 16.2     Hematocrit 05/06/2023 49.2     MCV 05/06/2023 90     MCH 05/06/2023 29.7     MCHC 05/06/2023 32.9     RDW 05/06/2023 12.3     MPV 05/06/2023 10.4     Platelets 00/43/3794 221     nRBC 05/06/2023 0     Neutrophils Relative 05/06/2023 46     Immat GRANS % 05/06/2023 0     Lymphocytes Relative 05/06/2023 42     Monocytes Relative 05/06/2023 10     Eosinophils Relative 05/06/2023 1     Basophils Relative 05/06/2023 1     Neutrophils Absolute 05/06/2023 1.90     Immature Grans Absolute 05/06/2023 0.01     Lymphocytes Absolute 05/06/2023 1.72     Monocytes Absolute 05/06/2023 0.42     Eosinophils Absolute 05/06/2023 0.05     Basophils Absolute 05/06/2023 0.04        Diagnostics:  I have personally reviewed pertinent reports.        ESS: Total score: 7  MRI cervical spine with and without contrast    Result Date: 9/23/2023  Narrative: MRI CERVICAL SPINE WITH AND WITHOUT CONTRAST INDICATION: G35: Multiple sclerosis. COMPARISON: 2/24/2022 TECHNIQUE:  Multiplanar, multisequence imaging of the cervical spine was performed before and after gadolinium administration. . IV Contrast:  6 mL of Gadobutrol injection (SINGLE-DOSE) IMAGE QUALITY:  Diagnostic. FINDINGS: ALIGNMENT: Stable alignment. No subluxation. There is dextroscoliosis of the upper thoracic spine. No compression fracture. MARROW SIGNAL:  Normal marrow signal is identified within the visualized bony structures. No discrete marrow lesion. CERVICAL AND VISUALIZED UPPER THORACIC CORD: There are numerous cord lesions identified throughout the cervical and upper thoracic cord both centrally and peripherally. There appears to be slight progression of disease when compared with the prior examination. In particular at the C4 and C5 levels. No cord expansion. PREVERTEBRAL AND PARASPINAL SOFT TISSUES:  Normal. VISUALIZED POSTERIOR FOSSA: Numerous small hyperintense lesions are identified on inversion recovery imaging within the visualized mid to inferior brainstem as well as the cerebellar white matter consistent with the provided history of demyelinating disease, similar to the prior examination. CERVICAL DISC SPACES: Minor cervical degenerative change with mild annular bulging at C5-6 and C6-7. No canal stenosis or foraminal nerve impingement. UPPER THORACIC DISC SPACES:  Normal. POSTCONTRAST IMAGING: There is subtle faint enhancement identified within the left paramedian aspect of the cervical cord at the level of the C5 superior endplate corresponding to one of the new/enlarging cord lesions described above. OTHER FINDINGS:  None. Impression: Progression of chronic demyelinating disease within the cervical cord in particular at the C4 and C5 levels. Subtle enhancement of a left paramedian cord lesion at the level of the C5 superior endplate suggests active inflammation/demyelination. Stable minor cervical degenerative change.  The study was marked in EPIC for significant notification.  Workstation performed: [id number]

## 2023-10-23 NOTE — TELEPHONE ENCOUNTER
Spoke w/pt. Advised him of note below. Pt verbalized understanding.  He will f/u w/PCP re: CRISTIANO.

## 2023-10-28 ENCOUNTER — APPOINTMENT (OUTPATIENT)
Dept: LAB | Facility: MEDICAL CENTER | Age: 33
End: 2023-10-28
Payer: COMMERCIAL

## 2023-10-28 DIAGNOSIS — G35 MULTIPLE SCLEROSIS (HCC): ICD-10-CM

## 2023-10-28 DIAGNOSIS — K70.0 ALCOHOLIC FATTY LIVER: ICD-10-CM

## 2023-10-28 LAB
ALBUMIN SERPL BCP-MCNC: 4.9 G/DL (ref 3.5–5)
ALP SERPL-CCNC: 59 U/L (ref 34–104)
ALT SERPL W P-5'-P-CCNC: 17 U/L (ref 7–52)
AST SERPL W P-5'-P-CCNC: 14 U/L (ref 13–39)
BASOPHILS # BLD AUTO: 0.03 THOUSANDS/ÂΜL (ref 0–0.1)
BASOPHILS NFR BLD AUTO: 1 % (ref 0–1)
BILIRUB DIRECT SERPL-MCNC: 0.13 MG/DL (ref 0–0.2)
BILIRUB SERPL-MCNC: 0.67 MG/DL (ref 0.2–1)
CRP SERPL QL: <1 MG/L
EOSINOPHIL # BLD AUTO: 0.07 THOUSAND/ÂΜL (ref 0–0.61)
EOSINOPHIL NFR BLD AUTO: 2 % (ref 0–6)
ERYTHROCYTE [DISTWIDTH] IN BLOOD BY AUTOMATED COUNT: 12.6 % (ref 11.6–15.1)
ERYTHROCYTE [SEDIMENTATION RATE] IN BLOOD: 1 MM/HOUR (ref 0–14)
GGT SERPL-CCNC: 18 U/L (ref 9–64)
HCT VFR BLD AUTO: 45.8 % (ref 36.5–49.3)
HGB BLD-MCNC: 15.8 G/DL (ref 12–17)
IMM GRANULOCYTES # BLD AUTO: 0.01 THOUSAND/UL (ref 0–0.2)
IMM GRANULOCYTES NFR BLD AUTO: 0 % (ref 0–2)
LDH SERPL-CCNC: 132 U/L (ref 140–271)
LYMPHOCYTES # BLD AUTO: 1.65 THOUSANDS/ÂΜL (ref 0.6–4.47)
LYMPHOCYTES NFR BLD AUTO: 43 % (ref 14–44)
MCH RBC QN AUTO: 31 PG (ref 26.8–34.3)
MCHC RBC AUTO-ENTMCNC: 34.5 G/DL (ref 31.4–37.4)
MCV RBC AUTO: 90 FL (ref 82–98)
MONOCYTES # BLD AUTO: 0.33 THOUSAND/ÂΜL (ref 0.17–1.22)
MONOCYTES NFR BLD AUTO: 9 % (ref 4–12)
NEUTROPHILS # BLD AUTO: 1.79 THOUSANDS/ÂΜL (ref 1.85–7.62)
NEUTS SEG NFR BLD AUTO: 45 % (ref 43–75)
NRBC BLD AUTO-RTO: 0 /100 WBCS
PLATELET # BLD AUTO: 223 THOUSANDS/UL (ref 149–390)
PMV BLD AUTO: 10 FL (ref 8.9–12.7)
PROT SERPL-MCNC: 7.3 G/DL (ref 6.4–8.4)
RBC # BLD AUTO: 5.1 MILLION/UL (ref 3.88–5.62)
TSH SERPL DL<=0.05 MIU/L-ACNC: 3.65 UIU/ML (ref 0.45–4.5)
WBC # BLD AUTO: 3.88 THOUSAND/UL (ref 4.31–10.16)

## 2023-10-28 PROCEDURE — 86645 CMV ANTIBODY IGM: CPT

## 2023-10-28 PROCEDURE — 86644 CMV ANTIBODY: CPT

## 2023-10-28 PROCEDURE — 80076 HEPATIC FUNCTION PANEL: CPT

## 2023-10-28 PROCEDURE — 82977 ASSAY OF GGT: CPT

## 2023-10-28 PROCEDURE — 83615 LACTATE (LD) (LDH) ENZYME: CPT

## 2023-10-28 PROCEDURE — 86803 HEPATITIS C AB TEST: CPT

## 2023-10-28 PROCEDURE — 86140 C-REACTIVE PROTEIN: CPT

## 2023-10-28 PROCEDURE — 85025 COMPLETE CBC W/AUTO DIFF WBC: CPT

## 2023-10-28 PROCEDURE — 86038 ANTINUCLEAR ANTIBODIES: CPT

## 2023-10-28 PROCEDURE — 36415 COLL VENOUS BLD VENIPUNCTURE: CPT

## 2023-10-28 PROCEDURE — 86664 EPSTEIN-BARR NUCLEAR ANTIGEN: CPT

## 2023-10-28 PROCEDURE — 86663 EPSTEIN-BARR ANTIBODY: CPT

## 2023-10-28 PROCEDURE — 84443 ASSAY THYROID STIM HORMONE: CPT

## 2023-10-28 PROCEDURE — 86665 EPSTEIN-BARR CAPSID VCA: CPT

## 2023-10-28 PROCEDURE — 85652 RBC SED RATE AUTOMATED: CPT

## 2023-10-28 PROCEDURE — 86706 HEP B SURFACE ANTIBODY: CPT

## 2023-10-29 LAB
ANA SER QL IA: NEGATIVE
HBV SURFACE AB SER-ACNC: 6.88 MIU/ML
HCV AB SER QL: NORMAL

## 2023-10-30 ENCOUNTER — TELEPHONE (OUTPATIENT)
Dept: NEUROLOGY | Facility: CLINIC | Age: 33
End: 2023-10-30

## 2023-10-30 DIAGNOSIS — G35 MULTIPLE SCLEROSIS (HCC): Primary | ICD-10-CM

## 2023-10-30 LAB
CMV IGG SERPL IA-ACNC: <0.6 U/ML (ref 0–0.59)
CMV IGM SERPL IA-ACNC: <30 AU/ML (ref 0–29.9)
EBV NA IGG SER IA-ACNC: 367 U/ML (ref 0–17.9)
EBV VCA IGG SER IA-ACNC: 177 U/ML (ref 0–17.9)
EBV VCA IGM SER IA-ACNC: <36 U/ML (ref 0–35.9)
INTERPRETATION: ABNORMAL

## 2023-10-30 NOTE — TELEPHONE ENCOUNTER
----- Message from Iesha Junior MD sent at 10/29/2023 11:30 AM EDT -----  Let pt know wbc lower at 3.88 and ANC lower at 1.79. any recent illnesses? Abs lymphs ok at 1.65. Also lfts good as well which is good sign including total bili and ast and alt. Pt still needs updated mri head, scheduled in nov right before next office appt. Ok to try to move up if possible, want to make sure we have results for his appt. Also I see pending results for cmv and ebv testing. Looks like ordered by pcp but I do not see an office note? Did pt see his pcp this past week, any illnesses? Repeat labs for us in 2 weeks to have for next appt. Likely plan to change imd to dimethyl fumarate (tier one) or kesimpta (tier 2). Immunoglobulin levels ok. Since mri head only 2 days prior to appt, if we can move up by a few days that would be helpful.

## 2023-10-30 NOTE — TELEPHONE ENCOUNTER
Spoke w/pt. Advised him of results and recommendations below. Pt verbalized understanding. Pt denies any recent illness/infection. Pt saw his PCP-Dr. Bowers Most to f/u on liver status. PCP ordered labs and U/S liver w/dx of fatty liver. Called CS w/patient on the line. Spoke w/Yoly. MRI brain MS rescheduled for this Saturday, 11/4/23 @ 5:15pm at [facility]. Scheduled pt for U/S liver for Monday, 11/20/23 @ 8:45am at [facility]. MRI brain MS referral updated w/above info. Pre-cert - please obtain authorization for MRI brain MS (see above). Thank you!     Dr. Abelino jimenes

## 2023-11-04 ENCOUNTER — HOSPITAL ENCOUNTER (OUTPATIENT)
Dept: MRI IMAGING | Facility: HOSPITAL | Age: 33
Discharge: HOME | End: 2023-11-04
Payer: COMMERCIAL

## 2023-11-04 DIAGNOSIS — G35 MULTIPLE SCLEROSIS (HCC): ICD-10-CM

## 2023-11-04 PROCEDURE — G1004 CDSM NDSC: HCPCS

## 2023-11-04 PROCEDURE — A9585 GADOBUTROL INJECTION: HCPCS | Performed by: PSYCHIATRY & NEUROLOGY

## 2023-11-04 PROCEDURE — 70553 MRI BRAIN STEM W/O & W/DYE: CPT

## 2023-11-04 RX ORDER — GADOBUTROL 604.72 MG/ML
6 INJECTION INTRAVENOUS
Status: COMPLETED | OUTPATIENT
Start: 2023-11-04 | End: 2023-11-04

## 2023-11-04 RX ADMIN — GADOBUTROL 6 ML: 604.72 INJECTION INTRAVENOUS at 17:41

## 2023-11-17 ENCOUNTER — OFFICE VISIT (OUTPATIENT)
Dept: NEUROLOGY | Facility: CLINIC | Age: 33
End: 2023-11-17
Payer: COMMERCIAL

## 2023-11-17 VITALS
HEART RATE: 91 BPM | DIASTOLIC BLOOD PRESSURE: 72 MMHG | RESPIRATION RATE: 18 BRPM | TEMPERATURE: 97.3 F | OXYGEN SATURATION: 97 % | SYSTOLIC BLOOD PRESSURE: 122 MMHG | BODY MASS INDEX: 19.76 KG/M2 | HEIGHT: 70 IN | WEIGHT: 138 LBS

## 2023-11-17 DIAGNOSIS — G35 MULTIPLE SCLEROSIS (HCC): Primary | ICD-10-CM

## 2023-11-17 PROCEDURE — 99214 OFFICE O/P EST MOD 30 MIN: CPT | Performed by: PHYSICIAN ASSISTANT

## 2023-11-17 RX ORDER — BACLOFEN 10 MG/1
TABLET ORAL
Qty: 30 TABLET | Refills: 0 | Status: SHIPPED | OUTPATIENT
Start: 2023-11-17 | End: 2023-12-13

## 2023-11-17 RX ORDER — CHOLESTYRAMINE 4 G/9G
POWDER, FOR SUSPENSION ORAL
Qty: 33 PACKET | Refills: 0 | Status: SHIPPED | OUTPATIENT
Start: 2023-11-17

## 2023-11-17 NOTE — PROGRESS NOTES
Patient ID: Rosibel Lange is a 35 y.o. male. Assessment/Plan:    Multiple sclerosis [facility]  Patient diagnosed with MS in 2016, on Aubagio initially until Dec 2018 when he d/c on his own and stopped following up with neurology. He returned in 2022 with noted disease progression on imaging and re-started Aubagio in April 2022. In April 2023 he was noted to have disease progression with no enhancement. Decision was made to cont Aubagio and repeat imaging in 6 months. In Sept 2023 he was having tingling in the fingertips bilaterally and LUE heaviness. Imaging showed disease progression in the c-spine with active demyelination. He was given 3 days of IV steroids. Change in DMT was discussed at last visit, but he was hesitant due to tolerating Aubagio well. Today he is ready to discuss change in DMT. Several options were discussed at last visit including Willem Whitehead. MRI brain 11/4/2023 was stable. After discussion with patient and  today, he has decided on Verlinda Rice due to disease progression in both brain and c-spine within the last year. I agree with more aggressive approach, as he is already on an oral and having breakthrough disease. Discussed Verlinda Rice is an immunosuppressant. Need to be mindful of increased risk of illness/infection. Will washout Aubagio using cholestyramine, then check a serum level. In the meantime, will check labs needed to consider B-cell depleting therapy including hepatitis testing, TB etc.  Immunoglobulins were checked in October and were normal.    He is having some tightness and heaviness still in the LUE (residual from exacerbation in Sept). Will Rx baclofen to help with tightness/spasticity. Encouraged stretching.     Plan:  -discontinue Aubagio  -complete 11 day cholestyramine washout  -check leflunomide level following washout  -additional labs for consideration of B-cell depleting therapy  -start form signed for Verlinda Rice which will be faxed to start authorization process  -start baclofen 10mg up to TID for left arm tightness/spasticity   -follow up in 2-3 months or sooner if needed       Diagnoses and all orders for this visit:    Multiple sclerosis ([address])  -     MISCELLANEOUS LAB TEST; Future  -     Quantiferon-TB Gold Plus, 1 tube; Future  -     Hepatitis B surface antigen; Future  -     Hepatitis B Core Ab Total W/Refl IgM; Future  -     cholestyramine (QUESTRAN) 4 g packet; Mix 1 packet with 8oz of liquid and drink three times a day (3 packets per day) x 11 days  -     baclofen 10 mg tablet; Take 1 po daily PRN spasm           Subjective:    HPI    Vladimir Rees is a 35 y.o. male with PMH of MS, JOSE who presents today for follow up. Patient was previously followed by our office for his MS, but had a prolonged absence from our office between May 2017 and January 2022 when he presented to re-establish care. To review, he was initially seen in Sept 2016 for eval of newly diagnosed MS. Patient had symptoms starting in April 2016 with some issues with his speech. He notes he would stop in the middle of the sentence, or have slurring of his speech. PCP ordered MRI and labs. Patient notes first MRI was done without contrast, and he was brought back in for contrasted study. Patient notes he was told by PCP that he likely had MS and was sent to [facility] neurology. He was seen in May 2016 by [facility] neurology and had additional studies including MRI c-spine and t-spine. Patient notes he was told he was going to start Tysabri, but no discussion of the med. When he researched the med on his own, he had concerns about taking the med as his first line therapy. MRI brain 4/2016 no acute infarct. Multiple primarily sub centimeter FLAIR hyperintense lesions present within the subcortical WM, juxtacortical location and deep PV white matter. Additionally a single small lesion present within the splenium of the corpus callosum.  At least one of the lesions has a somewhat Robert's finger-like morphology. The totally number of lesions is approximately 15 with the largest at 12mm within the subcortical WM of the left parietal lobe. Although non-specific, given the number and location of the lesions, most concerning for demyelinating process. Patient went back with contrast and there were several areas of enhancement notes within the subcortical WM of the frontal lobes bilaterally, left parietal, and madeline temporal lobes. MRI t-spine done 6/3/16 no cord lesions. MRI c-spine 6/3/16 no clear evidence of focal cord demyelination, mild DDD, no spinal stenosis. Question of artifact on sagittal films. JCV negative with index of 0.17, done on Oct 17, 2016. Patient started Jaret Isles as his first medication in late 2016. Patient returned to our office after 5 years absence. He reported he stopped Aubagio in December 2018. He felt he was doing well and did not feel he needed medication or follow up. He reported in the months leading up to returning to our office, he did notice some new symptoms including right-sided weakness, right hand tingling, occasional blurry vision. Following his visit to re-establish care, he had updated imaging completed. MRI brain 2/24/2022 compared to 4/2016. Significant disease progression noted including at least 10 enhancing supratentorial lesions. MRI c-spine 2/24/2022 compared to 6/2016. Multiple white matter lesions scattered throughout cervical spinal cord and possibly upper thoracic cord, new since prior exam.  No enhancing cord lesions. Patient was called with these results and IV steroids were offered. He completed 3 days of IV steroids 3/2, 3/3 and 3/4, overall tolerated well. Patient was seen on 3/25/22 and we discussed re-initiating DMT due to evidence of disease progression on imaging. He wanted to re-trial Aubagio, and start form was signed. He started Aubagio in April 2022. MRI brain 11/2022 stable.      He complained of memory difficulty and was referred to neuropsychology for neurocognitive assessment. He was evaluated by Dr. Yomaira Aviles in October 2022. Evaluation revealed intact cognitive functioning. Pattern of performance not suggestive of neurocognitive impact from multiple sclerosis. There was concern for clinically significant depressive symptomatology and it was felt his experience of cognitive difficulty (or perception thereof) is likely related to emotional distress. It was recommended he seek treatment for this. He started seeing a therapist.     He had updated MRI brain in April 2023 which showed progression of white matter changes without enhancement. Decision was made to continue Aubagio and repeat imaging in approx 6 months. In summer of 2023 it was noted that his WBCs were trending a bit lower and LFTs were fluctuating slightly (tbili and once had elevated ALT). He was sent to his PCP and had some additional testing. In September 2023 he reported tingling in the fingertips bilaterally x 3-4 weeks, as well as episodic left arm heaviness. Due to this, MRI c-spine was updated on 9/19/23 and showed progression at the C4-5 levels with subtle enhancement of a left paramedian cord lesion at the level of the C5 superior endplate suggesting active inflammation/demyelination. He had 3 days of IV steroids. When seen following c-spine imaging, discussion was had about potential change in DMT given disease progression. Patient was hesitant to change given good tolerability. Options discussed were Kesimpta, Tecfidera/dimethyl fumarate, Vumerity. Today, patient reports he is doing ok. The LUE symptoms are getting better. He still has times when the left arm feels heavy and tight. It can feel like its falling asleep before the heaviness and tightness. It lasts a few min to up to 30 min and resolves. He notices this more in the morning when he first starts using the arm.   He denies any new symptoms. Continues on Aubagio. He had updated MRI brain 11/4/23 which was stable. He is ready to consider a different DMT, either dimethyl fumarate or Kesimpta. His  feels he likely needs something stronger. Patient does not want an infusion. He would prefer oral, but would be open to an injectable like Thalia Metro. The following portions of the patient's history were reviewed and updated as appropriate: current medications, past family history, past medical history, past social history, past surgical history, and problem list.       Objective:    Blood pressure 122/72, pulse 91, temperature (!) 97.3 °F (36.3 °C), temperature source Temporal, resp. rate 18, height 5' 10" (1.778 m), weight 62.6 kg (138 lb), SpO2 97 %. Physical Exam  Constitutional:       Appearance: Normal appearance. Eyes:      Extraocular Movements: EOM normal.      Pupils: Pupils are equal, round, and reactive to light. Neurological:      Mental Status: He is alert. Motor: Motor strength is normal.     Deep Tendon Reflexes: Reflexes are normal and symmetric. Psychiatric:         Mood and Affect: Mood normal.         Speech: Speech normal.         Behavior: Behavior normal.         Neurological Exam  Mental Status  Alert. Oriented to person, place, time and situation. Speech is normal. Language is fluent with no aphasia. Attention and concentration are normal.    Cranial Nerves  CN II: Visual fields full to confrontation. CN III, IV, VI: Extraocular movements intact bilaterally. Pupils equal round and reactive to light bilaterally. CN V: Facial sensation is normal.  CN VII: Full and symmetric facial movement. CN VIII: Hearing is normal.  CN IX, X: Palate elevates symmetrically  CN XI: Shoulder shrug strength is normal.  CN XII: Tongue midline without atrophy or fasciculations. Motor   Normal muscle tone. Strength is 5/5 throughout all four extremities.     Sensory  Light touch is normal in upper and lower extremities. Reflexes  Deep tendon reflexes are 2+ and symmetric in all four extremities. Coordination  Right: Finger-to-nose normal.Left: Finger-to-nose normal.    Gait  Casual gait is normal including stance, stride, and arm swing. ROS:    Review of Systems   Constitutional:  Negative for chills and fever. HENT:  Negative for ear pain and sore throat. Eyes:  Negative for pain and visual disturbance. Respiratory:  Negative for cough and shortness of breath. Cardiovascular:  Negative for chest pain and palpitations. Gastrointestinal:  Negative for abdominal pain and vomiting. Genitourinary:  Positive for frequency. Negative for dysuria and hematuria. Musculoskeletal:  Positive for back pain (lower and upper), neck pain and neck stiffness. Negative for arthralgias. Skin:  Negative for color change and rash. Neurological:  Positive for weakness (left arm), numbness (hands and arm left side) and headaches (2-3 times a week when waking up). Negative for seizures and syncope. Psychiatric/Behavioral:  Positive for sleep disturbance. All other systems reviewed and are negative.     I personally reviewed and updated the ROS as appropriate

## 2023-11-17 NOTE — PATIENT INSTRUCTIONS
Once ready, stop Aubagio and start cholestyramine washout x 11 days.   After washout get blood work (in your chart)  Will submit start form for kesimpta and once ready, you can start  I sent a Rx for baclofen 10mg (muscle relaxer) to use as needed for left arm symptoms  Return in 2-3 months

## 2023-11-20 ENCOUNTER — HOSPITAL ENCOUNTER (OUTPATIENT)
Dept: RADIOLOGY | Facility: MEDICAL CENTER | Age: 33
Discharge: HOME | End: 2023-11-20
Payer: COMMERCIAL

## 2023-11-20 DIAGNOSIS — K76.0 FATTY LIVER: ICD-10-CM

## 2023-11-20 PROCEDURE — 76705 ECHO EXAM OF ABDOMEN: CPT

## 2023-11-27 ENCOUNTER — PATIENT MESSAGE (OUTPATIENT)
Dept: NEUROLOGY | Facility: CLINIC | Age: 33
End: 2023-11-27

## 2023-11-29 NOTE — PATIENT COMMUNICATION
To Whom It May Concern,    Birgit Hinojosa is under my professional care for a neurologic condition. He will be undergoing an 11-day course of medication therapy known as a cholestyramine washout. This medication can cause side effects such as upset stomach/stomach pain, heartburn, diarrhea/loose stools, requiring frequent bathroom trips, nausea and/or vomiting. These side effects are quite uncomfortable and can be disruptive in an office setting, but manageable from home. We recommend Mr. Mclaughlin be allowed to work from home during the course of this medication therapy. Please feel free to call the office at [phone] with any questions or concerns. Sincerely,    ALBAN Adair - are you agreeable to above letter?

## 2023-12-01 ENCOUNTER — TELEPHONE (OUTPATIENT)
Dept: NEUROLOGY | Facility: CLINIC | Age: 33
End: 2023-12-01

## 2023-12-01 NOTE — TELEPHONE ENCOUNTER
[facility] This is Reddy with alongside mike,  calling regarding a couple of kesimpta patients you had enrolled with us that Do need prior authorization for their kesimpta.  1st is Jose Miguel fischer, date of birth [birth year]. Again, june 6th, 1990, a patient of anibal mobley, and his patient does need a PA submitted for kesimpta. the cover my meds key for that is [id number]. Alternatively, you can call that into the plan at [phone] for that particular team with [facility].    If you have any questions on either of these feel free to call us back at [phone]. Thank you

## 2023-12-05 NOTE — TELEPHONE ENCOUNTER
"Per last office note, \"Once ready, stop Aubagio and start cholestyramine washout x 11 days.  After washout get blood work (in your chart)  Will submit start form for kesimpta and once ready, you can start.\"    Per chart review, pt began washout on 11/28/23. Should complete on 12/8/23. Pt then to have labs completed. Awaiting labs prior to submitting PA to avoid denial.  "

## 2023-12-09 ENCOUNTER — APPOINTMENT (OUTPATIENT)
Dept: LAB | Facility: MEDICAL CENTER | Age: 33
End: 2023-12-09
Payer: COMMERCIAL

## 2023-12-09 DIAGNOSIS — G35 MULTIPLE SCLEROSIS (HCC): ICD-10-CM

## 2023-12-09 LAB
HBV CORE IGM SER QL: NORMAL
HBV SURFACE AG SER QL: NORMAL

## 2023-12-09 PROCEDURE — 80193 DRUG ASSAY LEFLUNOMIDE: CPT

## 2023-12-09 PROCEDURE — 86480 TB TEST CELL IMMUN MEASURE: CPT

## 2023-12-09 PROCEDURE — 86705 HEP B CORE ANTIBODY IGM: CPT

## 2023-12-09 PROCEDURE — 87340 HEPATITIS B SURFACE AG IA: CPT

## 2023-12-10 LAB
GAMMA INTERFERON BACKGROUND BLD IA-ACNC: <0 IU/ML
M TB IFN-G BLD-IMP: NEGATIVE
M TB IFN-G CD4+ BCKGRND COR BLD-ACNC: 0 IU/ML
M TB IFN-G CD4+ BCKGRND COR BLD-ACNC: 0 IU/ML
MITOGEN IGNF BCKGRD COR BLD-ACNC: 10 IU/ML

## 2023-12-11 ENCOUNTER — TELEPHONE (OUTPATIENT)
Dept: NEUROLOGY | Facility: CLINIC | Age: 33
End: 2023-12-11

## 2023-12-11 NOTE — TELEPHONE ENCOUNTER
----- Message from Sherry Jerome PA-C sent at 12/11/2023  9:15 AM EST -----  Will do. The nurses are working on [facility], there is an encounter in chart. Many labs still pending, coming to my inbox, so will keep an eye out  ----- Message -----  From: Sharonda Duvall MD  Sent: 12/10/2023   6:17 AM EST  To: ALBAN Cee Mykelbirdie, looks like pt seen 11/17. See note. Looks like by 12/8 pt would be done cholestyramine washout. Cbc diff and lfts ok. I do not see the teriflunomide level yet or quant gold reported yet. Immunoglobulin testing completed in Oct. Please check on auth for kesimpta and let pt know when ready to start once rest of labs reported.

## 2023-12-11 NOTE — ASSESSMENT & PLAN NOTE
Patient diagnosed with MS in 2016, on Aubagio initially until Dec 2018 when he d/c on his own and stopped following up with neurology. He returned in 2022 with noted disease progression on imaging and re-started Aubagio in April 2022. In April 2023 he was noted to have disease progression with no enhancement. Decision was made to cont Aubagio and repeat imaging in 6 months. In Sept 2023 he was having tingling in the fingertips bilaterally and LUE heaviness. Imaging showed disease progression in the c-spine with active demyelination. He was given 3 days of IV steroids. Change in DMT was discussed at last visit, but he was hesitant due to tolerating Aubagio well. Today he is ready to discuss change in DMT. Several options were discussed at last visit including Willem Magdaleno. MRI brain 11/4/2023 was stable. After discussion with patient and  today, he has decided on Jeneal Shivers due to disease progression in both brain and c-spine within the last year. I agree with more aggressive approach, as he is already on an oral and having breakthrough disease. Discussed Jeneal Shivers is an immunosuppressant. Need to be mindful of increased risk of illness/infection. Will washout Aubagio using cholestyramine, then check a serum level. In the meantime, will check labs needed to consider B-cell depleting therapy including hepatitis testing, TB etc.  Immunoglobulins were checked in October and were normal.    He is having some tightness and heaviness still in the LUE (residual from exacerbation in Sept). Will Rx baclofen to help with tightness/spasticity. Encouraged stretching.     Plan:  -discontinue Aubagio  -complete 11 day cholestyramine washout  -check leflunomide level following washout  -additional labs for consideration of B-cell depleting therapy  -start form signed for [employer] which will be faxed to start authorization process  -start baclofen 10mg up to TID for left arm tightness/spasticity   -follow up in 2-3 months or sooner if needed

## 2023-12-12 NOTE — TELEPHONE ENCOUNTER
Labs complete. Aubagio level still in process.     Magnus ANDERSON submitted on [facility], Key: [id number]. Awaiting determination.

## 2023-12-13 DIAGNOSIS — G35 MULTIPLE SCLEROSIS (HCC): ICD-10-CM

## 2023-12-13 RX ORDER — BACLOFEN 10 MG/1
TABLET ORAL
Qty: 90 TABLET | Refills: 1 | Status: SHIPPED | OUTPATIENT
Start: 2023-12-13

## 2023-12-22 LAB — MISCELLANEOUS LAB TEST RESULT: NORMAL

## 2023-12-22 NOTE — TELEPHONE ENCOUNTER
Results not yet received.     Called [facility] at [phone] and spoke with Harini. She reports this test was sent out to [facility]. She will call to find out status.     Called [facility] at [phone]. On hold for about 1 hour. Will try again at another time.

## 2023-12-28 NOTE — TELEPHONE ENCOUNTER
Kesimpta is approved. No dates given on CMM. Awaiting approval letter.     Rx transferred to [facility] per CMM case.

## 2024-01-04 NOTE — TELEPHONE ENCOUNTER
Called [facility] at [phone] and spoke with OBDULIO. He reports they did receive paid claim for Kesimpta loading dose. Copay is over $7,000. They do not have copay assistance on file. Pt must call [phone] to enroll. They do not have PA end date on file.     Called [facility] at [phone]. On extended hold. Will try again at another time.     Left detailed message for pt making him aware of lab results as well as status of Kesimpta. Advised him to enroll in copay assistance and provide this information to [facility].

## 2024-01-11 NOTE — TELEPHONE ENCOUNTER
Per [facility] portal, Kesimpta was shipped to pt. Delivered on 1/6/24.This also shows PA is approved through 12/26/25.

## 2024-02-09 ENCOUNTER — OFFICE VISIT (OUTPATIENT)
Dept: NEUROLOGY | Facility: CLINIC | Age: 34
End: 2024-02-09
Payer: COMMERCIAL

## 2024-02-09 VITALS
BODY MASS INDEX: 19.76 KG/M2 | HEART RATE: 73 BPM | SYSTOLIC BLOOD PRESSURE: 127 MMHG | DIASTOLIC BLOOD PRESSURE: 67 MMHG | HEIGHT: 70 IN | RESPIRATION RATE: 18 BRPM | WEIGHT: 138 LBS | TEMPERATURE: 97.3 F | OXYGEN SATURATION: 99 %

## 2024-02-09 DIAGNOSIS — G35 MULTIPLE SCLEROSIS (HCC): Primary | ICD-10-CM

## 2024-02-09 PROCEDURE — 99214 OFFICE O/P EST MOD 30 MIN: CPT | Performed by: PHYSICIAN ASSISTANT

## 2024-02-09 RX ORDER — OFATUMUMAB 20 MG/.4ML
INJECTION, SOLUTION SUBCUTANEOUS
COMMUNITY
Start: 2024-01-29

## 2024-02-09 NOTE — PROGRESS NOTES
Patient ID: Jose Miguel Mclaughlin is a 33 y.o. male.    Assessment/Plan:    Multiple sclerosis (HCC)  Patient recently changed DMT from Aubagio to Kesimpta after new disease progression noted in c-spine in September 2023 (he was experiencing tingling in the fingertips bilaterally and LUE heaviness).  He started Kesimpta 1 month ago, completed the titration and just took his first monthly injection earlier this week.  He did experience flu-like symptoms with the 1st injection, but subsequent injections have gone well with no issues (and now also taking a Tylenol and a Benadryl 30 min prior to each injection).  His Aubagio was completely eliminated with cholestyramine and confirmed with serum drug level (not detectable after washout).    Will continue monthly Kesimpta injection.    His LUE symptoms have improved significantly, essentially resolved by the end of November.      MRI brain 11/4/2023 was stable.     Will check labs in April 2023 (CBC, CMP, immunoglobulins).  Will check MRI brain and c-spine in the summer (will order next visit)    His neurologic exam is stable today.    He will return in 4 months or sooner if needed.       Diagnoses and all orders for this visit:    Multiple sclerosis (HCC)  -     IgG, IgA, IgM; Future  -     CBC and differential; Future  -     Comprehensive metabolic panel; Future    Other orders  -     Kesimpta 20 MG/0.4ML SOAJ         Subjective:    HPI      Jose Miguel Mclaughlin is a 33 y.o. male with PMH of MS, JOSE who presents today for follow up.     Patient was previously followed by our office for his MS, but had a prolonged absence from our office between May 2017 and January 2022 when he presented to re-establish care.      To review, he was initially seen in Sept 2016 for eval of newly diagnosed MS. Patient had symptoms starting in April 2016 with some issues with his speech. He notes he would stop in the middle of the sentence, or have slurring of his speech. PCP ordered MRI and  labs. Patient notes first MRI was done without contrast, and he was brought back in for contrasted study. Patient notes he was told by PCP that he likely had MS and was sent to [facility] neurology. He was seen in May 2016 by [facility] neurology and had additional studies including MRI c-spine and t-spine. Patient notes he was told he was going to start Tysabri, but no discussion of the med. When he researched the med on his own, he had concerns about taking the med as his first line therapy. MRI brain 4/2016 no acute infarct. Multiple primarily sub centimeter FLAIR hyperintense lesions present within the subcortical WM, juxtacortical location and deep PV white matter. Additionally a single small lesion present within the splenium of the corpus callosum. At least one of the lesions has a somewhat Robert's finger-like morphology. The totally number of lesions is approximately 15 with the largest at 12mm within the subcortical WM of the left parietal lobe. Although non-specific, given the number and location of the lesions, most concerning for demyelinating process. Patient went back with contrast and there were several areas of enhancement notes within the subcortical WM of the frontal lobes bilaterally, left parietal, and madeline temporal lobes. MRI t-spine done 6/3/16 no cord lesions. MRI c-spine 6/3/16 no clear evidence of focal cord demyelination, mild DDD, no spinal stenosis. Question of artifact on sagittal films. JCV negative with index of 0.17, done on Oct 17, 2016. Patient started Aubagio as his first medication in late 2016.      Patient returned to our office after 5 years absence. He reported he stopped Aubagio in December 2018. He felt he was doing well and did not feel he needed medication or follow up. He reported in the months leading up to returning to our office, he did notice some new symptoms including right-sided weakness, right hand tingling, occasional blurry vision.      Following his visit to re-establish  care, he had updated imaging completed.   MRI brain 2/24/2022 compared to 4/2016. Significant disease progression noted including at least 10 enhancing supratentorial lesions.  MRI c-spine 2/24/2022 compared to 6/2016. Multiple white matter lesions scattered throughout cervical spinal cord and possibly upper thoracic cord, new since prior exam.  No enhancing cord lesions.   Patient was called with these results and IV steroids were offered. He completed 3 days of IV steroids 3/2, 3/3 and 3/4, overall tolerated well.     Patient was seen on 3/25/22 and we discussed re-initiating DMT due to evidence of disease progression on imaging. He wanted to re-trial Aubagio, and start form was signed. He started Aubagio in April 2022. MRI brain 11/2022 stable.     He complained of memory difficulty and was referred to neuropsychology for neurocognitive assessment. He was evaluated by Dr. Pappas in October 2022. Evaluation revealed intact cognitive functioning. Pattern of performance not suggestive of neurocognitive impact from multiple sclerosis. There was concern for clinically significant depressive symptomatology and it was felt his experience of cognitive difficulty (or perception thereof) is likely related to emotional distress. It was recommended he seek treatment for this. He started seeing a therapist.      He had updated MRI brain in April 2023 which showed progression of white matter changes without enhancement. Decision was made to continue Aubagio and repeat imaging in approx 6 months.     In summer of 2023 it was noted that his WBCs were trending a bit lower and LFTs were fluctuating slightly (tbili and once had elevated ALT). He was sent to his PCP and had some additional testing.      In September 2023 he reported tingling in the fingertips bilaterally x 3-4 weeks, as well as episodic left arm heaviness.   Due to this, MRI c-spine was updated on 9/19/23 and showed progression at the C4-5 levels with subtle  enhancement of a left paramedian cord lesion at the level of the C5 superior endplate suggesting active inflammation/demyelination.   He had 3 days of IV steroids.     When seen following c-spine imaging, discussion was had about potential change in DMT given disease progression. Patient was hesitant to change given good tolerability. Options discussed were Kesimpta, Tecfidera/dimethyl fumarate, Vumerity.  MRI brain updated 11/4/23 was stable.  He was seen for follow up on 11/17/23 and reported LUE symptoms were improving, but still having times when arm felt heavy and tight.  DMTs were discussed and he chose Kesimpta.    Patient initiated Kesimpta in early January 2024.     Today, patient reports he is doing well overall.  He denies any new symptoms.  He completed cholestyramine washout after d/c Aubagio, and level was not detectable on labs following washout.  He notes his bowels are back to normal since d/c Aubagio (was having more frequent BMs while on Aubagio).  He started Kesimpta 1 month ago (first titration injection was on 1/7/24).  He did have flu-like symptoms with the first injection and did not feel well at all, but with subsequent injections he is taking 1 tylenol and a benadryl about 30 min prior to injections, and this has been great.  No issues with subsequent injections.  He just took his first monthly injection after the titration on 2/4/24.  He also notes his LUE have completely resolved, no symptoms since right before Thanksgiving.  He does report some decreased vision in the L eye, which has been going on for a few months, but he has had a MRI since that time which was stable.  Last saw Dr. Perez in Aug 2023 and his exam was excellent.  No other new symptoms or concerns.    The following portions of the patient's history were reviewed and updated as appropriate: current medications, past family history, past medical history, past social history, past surgical history, and problem list.      "  Objective:    Blood pressure 127/67, pulse 73, temperature (!) 97.3 °F (36.3 °C), temperature source Temporal, resp. rate 18, height 5' 10\" (1.778 m), weight 62.6 kg (138 lb), SpO2 99%.    Physical Exam  Constitutional:       Appearance: Normal appearance.   Eyes:      Extraocular Movements: EOM normal.      Pupils: Pupils are equal, round, and reactive to light.   Neurological:      Mental Status: He is alert.      Motor: Motor strength is normal.     Coordination: Coordination is intact.      Deep Tendon Reflexes: Reflexes are normal and symmetric.   Psychiatric:         Mood and Affect: Mood normal.         Speech: Speech normal.         Behavior: Behavior normal.         Neurological Exam  Mental Status  Alert. Oriented to person, place, time and situation. Speech is normal. Language is fluent with no aphasia. Attention and concentration are normal.    Cranial Nerves  CN II: Visual fields full to confrontation.  CN III, IV, VI: Extraocular movements intact bilaterally. Pupils equal round and reactive to light bilaterally.  CN V: Facial sensation is normal.  CN VII: Full and symmetric facial movement.  CN VIII: Hearing is normal.  CN IX, X: Palate elevates symmetrically  CN XI: Shoulder shrug strength is normal.  CN XII: Tongue midline without atrophy or fasciculations.    Motor   Normal muscle tone. Strength is 5/5 throughout all four extremities.    Sensory  Light touch is normal in upper and lower extremities.     Reflexes  Deep tendon reflexes are 2+ and symmetric in all four extremities.    Coordination    Finger-to-nose, rapid alternating movements and heel-to-shin normal bilaterally without dysmetria.    Gait  Casual gait is normal including stance, stride, and arm swing.        ROS:    Review of Systems   Constitutional: Negative.  Negative for chills and fever.   HENT: Negative.  Negative for ear pain, hearing loss, sore throat and tinnitus.    Eyes: Negative.  Negative for pain and visual disturbance. "   Respiratory: Negative.  Negative for cough and shortness of breath.    Cardiovascular: Negative.  Negative for chest pain and palpitations.   Gastrointestinal:  Negative for constipation, diarrhea, nausea and vomiting.   Endocrine: Negative.    Genitourinary:  Negative for difficulty urinating and dysuria.   Musculoskeletal: Negative.  Negative for arthralgias and back pain.   Skin:  Negative for rash.   Allergic/Immunologic: Negative.    Neurological: Negative.  Negative for dizziness, seizures, syncope and headaches.   Hematological: Negative.    Psychiatric/Behavioral: Negative.  The patient is not nervous/anxious.    All other systems reviewed and are negative.    I personally reviewed and updated the ROS as appropriate

## 2024-02-09 NOTE — ASSESSMENT & PLAN NOTE
Patient recently changed DMT from Aubagio to Kesimpta after new disease progression noted in c-spine in September 2023 (he was experiencing tingling in the fingertips bilaterally and LUE heaviness).  He started Kesimpta 1 month ago, completed the titration and just took his first monthly injection earlier this week.  He did experience flu-like symptoms with the 1st injection, but subsequent injections have gone well with no issues (and now also taking a Tylenol and a Benadryl 30 min prior to each injection).  His Aubagio was completely eliminated with cholestyramine and confirmed with serum drug level (not detectable after washout).    Will continue monthly Kesimpta injection.    His LUE symptoms have improved significantly, essentially resolved by the end of November.      MRI brain 11/4/2023 was stable.     Will check labs in April 2023 (CBC, CMP, immunoglobulins).  Will check MRI brain and c-spine in the summer (will order next visit)    His neurologic exam is stable today.    He will return in 4 months or sooner if needed.

## 2024-04-20 ENCOUNTER — APPOINTMENT (OUTPATIENT)
Dept: LAB | Facility: MEDICAL CENTER | Age: 34
End: 2024-04-20
Payer: COMMERCIAL

## 2024-04-20 DIAGNOSIS — G35 MULTIPLE SCLEROSIS (HCC): ICD-10-CM

## 2024-04-20 LAB
ALBUMIN SERPL BCP-MCNC: 4.8 G/DL (ref 3.5–5)
ALP SERPL-CCNC: 57 U/L (ref 34–104)
ALT SERPL W P-5'-P-CCNC: 15 U/L (ref 7–52)
ANION GAP SERPL CALCULATED.3IONS-SCNC: 9 MMOL/L (ref 4–13)
AST SERPL W P-5'-P-CCNC: 16 U/L (ref 13–39)
BILIRUB SERPL-MCNC: 0.61 MG/DL (ref 0.2–1)
BUN SERPL-MCNC: 21 MG/DL (ref 5–25)
CALCIUM SERPL-MCNC: 9.8 MG/DL (ref 8.4–10.2)
CHLORIDE SERPL-SCNC: 105 MMOL/L (ref 96–108)
CO2 SERPL-SCNC: 29 MMOL/L (ref 21–32)
CREAT SERPL-MCNC: 0.76 MG/DL (ref 0.6–1.3)
GFR SERPL CREATININE-BSD FRML MDRD: 119 ML/MIN/1.73SQ M
GLUCOSE P FAST SERPL-MCNC: 91 MG/DL (ref 65–99)
IGA SERPL-MCNC: 218 MG/DL (ref 66–433)
IGG SERPL-MCNC: 1095 MG/DL (ref 635–1741)
IGM SERPL-MCNC: 85 MG/DL (ref 45–281)
POTASSIUM SERPL-SCNC: 4.3 MMOL/L (ref 3.5–5.3)
PROT SERPL-MCNC: 7.4 G/DL (ref 6.4–8.4)
SODIUM SERPL-SCNC: 143 MMOL/L (ref 135–147)

## 2024-04-20 PROCEDURE — 82784 ASSAY IGA/IGD/IGG/IGM EACH: CPT

## 2024-04-20 PROCEDURE — 80053 COMPREHEN METABOLIC PANEL: CPT

## 2024-05-31 ENCOUNTER — HOSPITAL ENCOUNTER (OUTPATIENT)
Dept: SLEEP CENTER | Facility: CLINIC | Age: 34
Discharge: HOME | End: 2024-05-31
Payer: COMMERCIAL

## 2024-05-31 DIAGNOSIS — G35 MULTIPLE SCLEROSIS (HCC): ICD-10-CM

## 2024-05-31 DIAGNOSIS — G47.09 OTHER INSOMNIA: ICD-10-CM

## 2024-05-31 DIAGNOSIS — G47.00 FREQUENT NOCTURNAL AWAKENING: ICD-10-CM

## 2024-05-31 PROCEDURE — 95810 POLYSOM 6/> YRS 4/> PARAM: CPT | Performed by: INTERNAL MEDICINE

## 2024-05-31 PROCEDURE — 95810 POLYSOM 6/> YRS 4/> PARAM: CPT

## 2024-06-01 PROBLEM — G47.09 OTHER INSOMNIA: Status: ACTIVE | Noted: 2024-06-01

## 2024-06-01 NOTE — PROGRESS NOTES
Sleep Study Documentation    Pre-Sleep Study       Sleep testing procedure explained to patient:YES    Patient napped prior to study:NO    Caffeine:Dayshift worker after 12PM.  Caffeine use:NO    Alcohol:Dayshift workers after 5PM: Alcohol use:NO    Typical day for patient:YES       Study Documentation    Sleep Study Indications: Frequent nocturnal awakenings, insomnia    Sleep Study: Diagnostic   Snore:None  Supplemental O2: no    O2 flow rate (L/min) range   O2 flow rate (L/min) final   Minimum SaO2 93  Baseline SaO2 97    Mode of Therapy:    EKG abnormalities: no     EEG abnormalities: no    Were abnormal behaviors in sleep observed:NO    Is Total Sleep Study Recording Time < 2 hours: N/A    Is Total Sleep Study Recording Time > 2 hours but study is incomplete: N/A    Is Total Sleep Study Recording Time 6 hours or more but sleep was not obtained: NO    Patient classification: employed       Post-Sleep Study    Medication used at bedtime or during sleep study:YES over the counter sleep aid    Patient reports time it took to fall asleep:greater than 60 minutes    Patient reports waking up during study:3 or more times.  Patient reports returning to sleep in 10 to 30 minutes.    Patient reports sleeping 2 to 4 hours without dreaming.    Does the Patient feel this is a typical night of sleep:typical    Patient rated sleepiness: Not sleepy or tired    PAP treatment:no.

## 2024-06-28 ENCOUNTER — OFFICE VISIT (OUTPATIENT)
Dept: NEUROLOGY | Facility: CLINIC | Age: 34
End: 2024-06-28
Payer: COMMERCIAL

## 2024-06-28 VITALS
DIASTOLIC BLOOD PRESSURE: 72 MMHG | WEIGHT: 143 LBS | BODY MASS INDEX: 20.52 KG/M2 | SYSTOLIC BLOOD PRESSURE: 108 MMHG | TEMPERATURE: 97.9 F | HEART RATE: 83 BPM | OXYGEN SATURATION: 97 %

## 2024-06-28 DIAGNOSIS — G35 MULTIPLE SCLEROSIS (HCC): Primary | ICD-10-CM

## 2024-06-28 PROCEDURE — 99214 OFFICE O/P EST MOD 30 MIN: CPT | Performed by: PHYSICIAN ASSISTANT

## 2024-06-28 NOTE — ASSESSMENT & PLAN NOTE
Patient changed DMT from Aubagio to Kesimpta in Jan 2024 due to new disease progression noted in c-spine in September 2023 (he was experiencing tingling in the fingertips bilaterally and LUE heaviness).  He has been tolerating Kesimpta well, no issues with the injections.    Labs 4/20/24 with normal CBC, CMP, immunoglobulins.  No recent illness, infections.    Will update MRI brain and c-spine this summer due to change in therapy and disease progression on last set of imaging.    Will have him update labs prior to his next visit in October    Neurologic exam is stable today.    Follow up in 4 months or sooner if needed

## 2024-06-28 NOTE — PROGRESS NOTES
Patient ID: Jose Miguel Mclaughlin is a 34 y.o. male.    Assessment/Plan:    Multiple sclerosis (HCC)  Patient changed DMT from Aubagio to Kesimpta in Jan 2024 due to new disease progression noted in c-spine in September 2023 (he was experiencing tingling in the fingertips bilaterally and LUE heaviness).  He has been tolerating Kesimpta well, no issues with the injections.    Labs 4/20/24 with normal CBC, CMP, immunoglobulins.  No recent illness, infections.    Will update MRI brain and c-spine this summer due to change in therapy and disease progression on last set of imaging.    Will have him update labs prior to his next visit in October    Neurologic exam is stable today.    Follow up in 4 months or sooner if needed       Diagnoses and all orders for this visit:    Multiple sclerosis (HCC)  -     MRI brain MS wo and w contrast; Future  -     MRI cervical spine with and without contrast; Future  -     CBC and differential; Future  -     Comprehensive metabolic panel; Future  -     IgG, IgA, IgM; Future           Subjective:    HPI    Jose Miguel Mclaughlin is a 34 y.o. male with PMH of MS, JOSE who presents today for follow up.     Patient was previously followed by our office for his MS, but had a prolonged absence from our office between May 2017 and January 2022 when he presented to re-establish care.      To review, he was initially seen in Sept 2016 for eval of newly diagnosed MS. Patient had symptoms starting in April 2016 with some issues with his speech. He notes he would stop in the middle of the sentence, or have slurring of his speech. PCP ordered MRI and labs. Patient notes first MRI was done without contrast, and he was brought back in for contrasted study. Patient notes he was told by PCP that he likely had MS and was sent to [facility] neurology. He was seen in May 2016 by [facility] neurology and had additional studies including MRI c-spine and t-spine. Patient notes he was told he was going to start Tysabri, but no  discussion of the med. When he researched the med on his own, he had concerns about taking the med as his first line therapy. MRI brain 4/2016 no acute infarct. Multiple primarily sub centimeter FLAIR hyperintense lesions present within the subcortical WM, juxtacortical location and deep PV white matter. Additionally a single small lesion present within the splenium of the corpus callosum. At least one of the lesions has a somewhat Robert's finger-like morphology. The totally number of lesions is approximately 15 with the largest at 12mm within the subcortical WM of the left parietal lobe. Although non-specific, given the number and location of the lesions, most concerning for demyelinating process. Patient went back with contrast and there were several areas of enhancement notes within the subcortical WM of the frontal lobes bilaterally, left parietal, and madeline temporal lobes. MRI t-spine done 6/3/16 no cord lesions. MRI c-spine 6/3/16 no clear evidence of focal cord demyelination, mild DDD, no spinal stenosis. Question of artifact on sagittal films. JCV negative with index of 0.17, done on Oct 17, 2016. Patient started Aubagio as his first medication in late 2016.      Patient returned to our office after 5 years absence. He reported he stopped Aubagio in December 2018. He felt he was doing well and did not feel he needed medication or follow up. He reported in the months leading up to returning to our office, he did notice some new symptoms including right-sided weakness, right hand tingling, occasional blurry vision.      Following his visit to re-establish care, he had updated imaging completed.   MRI brain 2/24/2022 compared to 4/2016. Significant disease progression noted including at least 10 enhancing supratentorial lesions.  MRI c-spine 2/24/2022 compared to 6/2016. Multiple white matter lesions scattered throughout cervical spinal cord and possibly upper thoracic cord, new since prior exam.  No enhancing  cord lesions.   Patient was called with these results and IV steroids were offered. He completed 3 days of IV steroids 3/2, 3/3 and 3/4, overall tolerated well.     Patient was seen on 3/25/22 and we discussed re-initiating DMT due to evidence of disease progression on imaging. He wanted to re-trial Aubagio, and start form was signed. He started Aubagio in April 2022. MRI brain 11/2022 stable.     He complained of memory difficulty and was referred to neuropsychology for neurocognitive assessment. He was evaluated by Dr. Pappas in October 2022. Evaluation revealed intact cognitive functioning. Pattern of performance not suggestive of neurocognitive impact from multiple sclerosis. There was concern for clinically significant depressive symptomatology and it was felt his experience of cognitive difficulty (or perception thereof) is likely related to emotional distress. It was recommended he seek treatment for this. He started seeing a therapist.      He had updated MRI brain in April 2023 which showed progression of white matter changes without enhancement. Decision was made to continue Aubagio and repeat imaging in approx 6 months.     In summer of 2023 it was noted that his WBCs were trending a bit lower and LFTs were fluctuating slightly (tbili and once had elevated ALT). He was sent to his PCP and had some additional testing.      In September 2023 he reported tingling in the fingertips bilaterally x 3-4 weeks, as well as episodic left arm heaviness.   Due to this, MRI c-spine was updated on 9/19/23 and showed progression at the C4-5 levels with subtle enhancement of a left paramedian cord lesion at the level of the C5 superior endplate suggesting active inflammation/demyelination.   He had 3 days of IV steroids.     When seen following c-spine imaging, discussion was had about potential change in DMT given disease progression. Patient was hesitant to change given good tolerability. Options discussed were  Kesimpta, Tecfidera/dimethyl fumarate, Vumerity.  MRI brain updated 11/4/23 was stable.  He was seen for follow up on 11/17/23 and reported LUE symptoms were improving, but still having times when arm felt heavy and tight. DMTs were discussed and he chose Kesimpta. He completed cholestyramine washout after d/c Aubagio, and level was not detectable on labs following washout.     Patient initiated Kesimpta in early January 2024.  He did have flu-like symptoms with the first injection and did not feel well at all, but with subsequent injections he is taking 1 tylenol and a Benadryl about 30 min prior to injections, and this has helped, no issues.     Today, patient reports he is doing well overall. He denies any new symptoms. He continues on Kesimpta.  Labs completed 4/20/24 - normal CBC, CMP, immunoglobulins.  Denies recent illness, infections.  He had a sleep study in May which did not show sleep apnea, PMLs.  There was sleep onset insomnia. He has not followed up with sleep medicine (he was never called with the results but read the report).    The following portions of the patient's history were reviewed and updated as appropriate: current medications, past family history, past medical history, past social history, past surgical history, and problem list.         Objective:    Blood pressure 108/72, pulse 83, temperature 97.9 °F (36.6 °C), temperature source Temporal, weight 64.9 kg (143 lb), SpO2 97%.    Physical Exam  Constitutional:       Appearance: Normal appearance.   Eyes:      Extraocular Movements: EOM normal.      Pupils: Pupils are equal, round, and reactive to light.   Neurological:      Mental Status: He is alert.      Motor: Motor strength is normal.     Deep Tendon Reflexes: Reflexes are normal and symmetric.   Psychiatric:         Mood and Affect: Mood normal.         Speech: Speech normal.         Behavior: Behavior normal.         Neurological Exam  Mental Status  Alert. Oriented to person, place,  time and situation. Speech is normal. Language is fluent with no aphasia. Attention and concentration are normal.    Cranial Nerves  CN II: Visual fields full to confrontation.  CN III, IV, VI: Extraocular movements intact bilaterally. Pupils equal round and reactive to light bilaterally.  CN V: Facial sensation is normal.  CN VII: Full and symmetric facial movement.  CN VIII: Hearing is normal.  CN IX, X: Palate elevates symmetrically  CN XI: Shoulder shrug strength is normal.  CN XII: Tongue midline without atrophy or fasciculations.    Motor   Normal muscle tone. Strength is 5/5 throughout all four extremities.    Sensory  Light touch is normal in upper and lower extremities.     Reflexes  Deep tendon reflexes are 2+ and symmetric in all four extremities.    Coordination  Right: Finger-to-nose normal.Left: Finger-to-nose normal.    Gait  Casual gait is normal including stance, stride, and arm swing.        ROS:    Review of Systems   Constitutional:  Positive for fatigue. Negative for appetite change and fever.   HENT: Negative.  Negative for hearing loss, tinnitus, trouble swallowing and voice change.    Eyes: Negative.  Negative for photophobia, pain and visual disturbance.   Respiratory: Negative.  Negative for shortness of breath.    Cardiovascular: Negative.  Negative for palpitations.   Gastrointestinal: Negative.  Negative for nausea and vomiting.   Endocrine: Negative.  Negative for cold intolerance.   Genitourinary: Negative.  Negative for dysuria, frequency and urgency.   Musculoskeletal:  Positive for myalgias (denies any spasms). Negative for back pain, gait problem, neck pain and neck stiffness.   Skin: Negative.  Negative for rash.   Allergic/Immunologic: Negative.    Neurological: Negative.  Negative for dizziness, tremors, seizures, syncope, facial asymmetry, speech difficulty, weakness, light-headedness, numbness and headaches.   Hematological: Negative.  Does not bruise/bleed easily.    Psychiatric/Behavioral:  Positive for sleep disturbance (not sleeping well). Negative for confusion and hallucinations.      I personally reviewed and updated the ROS as appropriate

## 2024-06-28 NOTE — PATIENT INSTRUCTIONS
Continue monthly Kesimpta   Blood work and imaging in September/early Oct before seeing Dr. Owen  Make an appt with Dr. Martinez from sleep medicine to review sleep study.

## 2024-09-14 ENCOUNTER — APPOINTMENT (OUTPATIENT)
Dept: LAB | Facility: MEDICAL CENTER | Age: 34
End: 2024-09-14
Payer: COMMERCIAL

## 2024-09-14 DIAGNOSIS — G35 MULTIPLE SCLEROSIS (HCC): ICD-10-CM

## 2024-09-14 LAB
ALBUMIN SERPL BCG-MCNC: 4.6 G/DL (ref 3.5–5)
ALP SERPL-CCNC: 67 U/L (ref 34–104)
ALT SERPL W P-5'-P-CCNC: 14 U/L (ref 7–52)
ANION GAP SERPL CALCULATED.3IONS-SCNC: 6 MMOL/L (ref 4–13)
AST SERPL W P-5'-P-CCNC: 13 U/L (ref 13–39)
BASOPHILS # BLD AUTO: 0.04 THOUSANDS/ΜL (ref 0–0.1)
BASOPHILS NFR BLD AUTO: 1 % (ref 0–1)
BILIRUB SERPL-MCNC: 0.62 MG/DL (ref 0.2–1)
BUN SERPL-MCNC: 24 MG/DL (ref 5–25)
CALCIUM SERPL-MCNC: 9.6 MG/DL (ref 8.4–10.2)
CHLORIDE SERPL-SCNC: 105 MMOL/L (ref 96–108)
CO2 SERPL-SCNC: 30 MMOL/L (ref 21–32)
CREAT SERPL-MCNC: 0.9 MG/DL (ref 0.6–1.3)
EOSINOPHIL # BLD AUTO: 0.08 THOUSAND/ΜL (ref 0–0.61)
EOSINOPHIL NFR BLD AUTO: 1 % (ref 0–6)
ERYTHROCYTE [DISTWIDTH] IN BLOOD BY AUTOMATED COUNT: 12.2 % (ref 11.6–15.1)
GFR SERPL CREATININE-BSD FRML MDRD: 111 ML/MIN/1.73SQ M
GLUCOSE P FAST SERPL-MCNC: 109 MG/DL (ref 65–99)
HCT VFR BLD AUTO: 45 % (ref 36.5–49.3)
HGB BLD-MCNC: 15.7 G/DL (ref 12–17)
IGA SERPL-MCNC: 207 MG/DL (ref 66–433)
IGG SERPL-MCNC: 991 MG/DL (ref 635–1741)
IGM SERPL-MCNC: 65 MG/DL (ref 45–281)
IMM GRANULOCYTES # BLD AUTO: 0.01 THOUSAND/UL (ref 0–0.2)
IMM GRANULOCYTES NFR BLD AUTO: 0 % (ref 0–2)
LYMPHOCYTES # BLD AUTO: 1.76 THOUSANDS/ΜL (ref 0.6–4.47)
LYMPHOCYTES NFR BLD AUTO: 32 % (ref 14–44)
MCH RBC QN AUTO: 31.4 PG (ref 26.8–34.3)
MCHC RBC AUTO-ENTMCNC: 34.9 G/DL (ref 31.4–37.4)
MCV RBC AUTO: 90 FL (ref 82–98)
MONOCYTES # BLD AUTO: 0.43 THOUSAND/ΜL (ref 0.17–1.22)
MONOCYTES NFR BLD AUTO: 8 % (ref 4–12)
NEUTROPHILS # BLD AUTO: 3.24 THOUSANDS/ΜL (ref 1.85–7.62)
NEUTS SEG NFR BLD AUTO: 58 % (ref 43–75)
NRBC BLD AUTO-RTO: 0 /100 WBCS
PLATELET # BLD AUTO: 262 THOUSANDS/UL (ref 149–390)
PMV BLD AUTO: 10 FL (ref 8.9–12.7)
POTASSIUM SERPL-SCNC: 4 MMOL/L (ref 3.5–5.3)
PROT SERPL-MCNC: 7 G/DL (ref 6.4–8.4)
RBC # BLD AUTO: 5 MILLION/UL (ref 3.88–5.62)
SODIUM SERPL-SCNC: 141 MMOL/L (ref 135–147)
WBC # BLD AUTO: 5.56 THOUSAND/UL (ref 4.31–10.16)

## 2024-09-14 PROCEDURE — 80053 COMPREHEN METABOLIC PANEL: CPT

## 2024-09-14 PROCEDURE — 85025 COMPLETE CBC W/AUTO DIFF WBC: CPT

## 2024-09-14 PROCEDURE — 82784 ASSAY IGA/IGD/IGG/IGM EACH: CPT

## 2024-09-14 PROCEDURE — 36415 COLL VENOUS BLD VENIPUNCTURE: CPT

## 2024-09-17 ENCOUNTER — HOSPITAL ENCOUNTER (OUTPATIENT)
Dept: MRI IMAGING | Facility: HOSPITAL | Age: 34
Discharge: HOME | End: 2024-09-17
Payer: COMMERCIAL

## 2024-09-17 DIAGNOSIS — G35 MULTIPLE SCLEROSIS (HCC): ICD-10-CM

## 2024-09-17 PROCEDURE — 72156 MRI NECK SPINE W/O & W/DYE: CPT

## 2024-09-17 PROCEDURE — 70553 MRI BRAIN STEM W/O & W/DYE: CPT

## 2024-09-17 PROCEDURE — A9585 GADOBUTROL INJECTION: HCPCS | Performed by: INTERNAL MEDICINE

## 2024-09-17 RX ORDER — GADOBUTROL 604.72 MG/ML
6 INJECTION INTRAVENOUS
Status: COMPLETED | OUTPATIENT
Start: 2024-09-17 | End: 2024-09-17

## 2024-09-17 RX ADMIN — GADOBUTROL 6 ML: 604.72 INJECTION INTRAVENOUS at 20:43

## 2024-09-30 ENCOUNTER — TELEPHONE (OUTPATIENT)
Dept: NEUROLOGY | Facility: CLINIC | Age: 34
End: 2024-09-30

## 2024-09-30 NOTE — TELEPHONE ENCOUNTER
LVMM for patient Dr. Owen will not be in office on the date of their appt.10/11/24 10:30am [place]. Appt will be cxl and must be r/s. Gave central # for return call

## 2024-10-10 NOTE — TELEPHONE ENCOUNTER
Pt called he missed the phone call on 9/30 about the appt needing to be r/s.   Please call pt back to r/s appt with Dr. Owen.   Thank you

## 2024-10-21 ENCOUNTER — TELEPHONE (OUTPATIENT)
Dept: NEUROLOGY | Facility: CLINIC | Age: 34
End: 2024-10-21

## 2024-10-21 ENCOUNTER — TELEMEDICINE (OUTPATIENT)
Dept: NEUROLOGY | Facility: CLINIC | Age: 34
End: 2024-10-21
Payer: COMMERCIAL

## 2024-10-21 DIAGNOSIS — G35 MULTIPLE SCLEROSIS (HCC): Primary | ICD-10-CM

## 2024-10-21 PROCEDURE — 99214 OFFICE O/P EST MOD 30 MIN: CPT | Performed by: PSYCHIATRY & NEUROLOGY

## 2024-10-21 NOTE — ASSESSMENT & PLAN NOTE
Patient here today for neuro follow-up  Patient here for MS follow-up.  Patient last seen on June 28, 2024.  Since last visit no recent hospitalizations.  No recent infections.  Exam is stable.  Patient remains on Kesimpta  Patient had recent MRI of the head done on 9/17/2024 compared to November 2023.  Moderate chronic demyelinating plaques stable.  No new or progressive signal abnormality.  No acute evidence of demyelination stroke or hemorrhage.  MRI cervical spine September 17, 2024 compared to September 2023 1 year comparison.  Moderate to advanced demyelinating plaques in the visualized spinal cord.  Previously noted enhancing lesion at C5 remains but the enhancement has resolved.  No new lesions.  Patient also with mild spondylotic changes which is also stable.  Updated labs on September 14, IgA, IgG and IgM within normal limits.  White count 5.56, normal absolute neutrophilic count and absolute lymphocytic.  LFTs normal.  .  Exam stable.  Will continue with Kesimpta  Labs in 3 months  Follow up in 4 months      Orders:    IgG, IgA, IgM; Future    CBC (Includes Diff/Plt) (Refl); Future    Hepatic function panel; Future

## 2024-10-21 NOTE — TELEPHONE ENCOUNTER
Pt seen virtually today.  Mail out labs to him to be done in 3 months.  Needs follow up in 4 months with anibal.

## 2024-10-21 NOTE — PROGRESS NOTES
Virtual Regular Visit  Name: Jose Miguel Mclaughlin      : 1990      MRN: [id number]  Encounter Provider: Jodie Owen MD  Encounter Date: 10/21/2024   Encounter department: [facility]    Verification of patient location:    Patient is located at Other in the following state in which I hold an active license [place]    Assessment & Plan  Multiple sclerosis (HCC)  Patient here today for neuro follow-up  Patient here for MS follow-up.  Patient last seen on 2024.  Since last visit no recent hospitalizations.  No recent infections.  Exam is stable.  Patient remains on Kesimpta  Patient had recent MRI of the head done on 2024 compared to 2023.  Moderate chronic demyelinating plaques stable.  No new or progressive signal abnormality.  No acute evidence of demyelination stroke or hemorrhage.  MRI cervical spine 2024 compared to 2023 1 year comparison.  Moderate to advanced demyelinating plaques in the visualized spinal cord.  Previously noted enhancing lesion at C5 remains but the enhancement has resolved.  No new lesions.  Patient also with mild spondylotic changes which is also stable.  Updated labs on , IgA, IgG and IgM within normal limits.  White count 5.56, normal absolute neutrophilic count and absolute lymphocytic.  LFTs normal.  .  Exam stable.  Will continue with Kesimpta  Labs in 3 months  Follow up in 4 months      Orders:    IgG, IgA, IgM; Future    CBC (Includes Diff/Plt) (Refl); Future    Hepatic function panel; Future        Encounter provider Jodie Owen MD    The patient was identified by name and date of birth. Jose Miguel Mclaughlin was informed that this is a telemedicine visit and that the visit is being conducted through the Epic Embedded platform. He agrees to proceed..  My office door was closed. No one else was in the room.  He acknowledged consent and understanding of privacy and security of the video  "platform. The patient has agreed to participate and understands they can discontinue the visit at any time.    Patient is aware this is a billable service.     History of Present Illness     Jose Miguel Mclaughlin is a 34 y.o. male who presents for neuro follow up.  Pt last seen on 6/28/24 by anibal mobley. Per anibal last note \"Patient was previously followed by our office for his MS, but had a prolonged absence from our office between May 2017 and January 2022 when he presented to re-establish care.      To review, he was initially seen in Sept 2016 for eval of newly diagnosed MS. Patient had symptoms starting in April 2016 with some issues with his speech. He notes he would stop in the middle of the sentence, or have slurring of his speech. PCP ordered MRI and labs. Patient notes first MRI was done without contrast, and he was brought back in for contrasted study. Patient notes he was told by PCP that he likely had MS and was sent to [facility] neurology. He was seen in May 2016 by [facility] neurology and had additional studies including MRI c-spine and t-spine. Patient notes he was told he was going to start Tysabri, but no discussion of the med. When he researched the med on his own, he had concerns about taking the med as his first line therapy. MRI brain 4/2016 no acute infarct. Multiple primarily sub centimeter FLAIR hyperintense lesions present within the subcortical WM, juxtacortical location and deep PV white matter. Additionally a single small lesion present within the splenium of the corpus callosum. At least one of the lesions has a somewhat Robert's finger-like morphology. The totally number of lesions is approximately 15 with the largest at 12mm within the subcortical WM of the left parietal lobe. Although non-specific, given the number and location of the lesions, most concerning for demyelinating process. Patient went back with contrast and there were several areas of enhancement notes within the subcortical WM of " the frontal lobes bilaterally, left parietal, and madeline temporal lobes. MRI t-spine done 6/3/16 no cord lesions. MRI c-spine 6/3/16 no clear evidence of focal cord demyelination, mild DDD, no spinal stenosis. Question of artifact on sagittal films. JCV negative with index of 0.17, done on Oct 17, 2016. Patient started Aubagio as his first medication in late 2016. Patient returned to our office after 5 years absence. He reported he stopped Aubagio in December 2018. He felt he was doing well and did not feel he needed medication or follow up. He reported in the months leading up to returning to our office, he did notice some new symptoms including right-sided weakness, right hand tingling, occasional blurry vision. Following his visit to re-establish care, he had updated imaging completed. MRI brain 2/24/2022 compared to 4/2016. Significant disease progression noted including at least 10 enhancing supratentorial lesions.  MRI c-spine 2/24/2022 compared to 6/2016. Multiple white matter lesions scattered throughout cervical spinal cord and possibly upper thoracic cord, new since prior exam.  No enhancing cord lesions. Patient was called with these results and IV steroids were offered. He completed 3 days of IV steroids 3/2, 3/3 and 3/4, overall tolerated well.     Patient was seen on 3/25/22 and we discussed re-initiating DMT due to evidence of disease progression on imaging. He wanted to re-trial Aubagio, and start form was signed. He started Aubagio in April 2022. MRI brain 11/2022 stable.     He complained of memory difficulty and was referred to neuropsychology for neurocognitive assessment. He was evaluated by Dr. Pappas in October 2022. Evaluation revealed intact cognitive functioning. Pattern of performance not suggestive of neurocognitive impact from multiple sclerosis. There was concern for clinically significant depressive symptomatology and it was felt his experience of cognitive difficulty (or perception  "thereof) is likely related to emotional distress. It was recommended he seek treatment for this. He started seeing a therapist. He had updated MRI brain in April 2023 which showed progression of white matter changes without enhancement. Decision was made to continue Aubagio and repeat imaging in approx 6 months.In summer of 2023 it was noted that his WBCs were trending a bit lower and LFTs were fluctuating slightly (tbili and once had elevated ALT). He was sent to his PCP and had some additional testing. In September 2023 he reported tingling in the fingertips bilaterally x 3-4 weeks, as well as episodic left arm heaviness. Due to this, MRI c-spine was updated on 9/19/23 and showed progression at the C4-5 levels with subtle enhancement of a left paramedian cord lesion at the level of the C5 superior endplate suggesting active inflammation/demyelination. He had 3 days of IV steroids.When seen following c-spine imaging, discussion was had about potential change in DMT given disease progression. Patient was hesitant to change given good tolerability. Options discussed were Kesimpta, Tecfidera/dimethyl fumarate, Vumerity.MRI brain updated 11/4/23 was stable.He was seen for follow up on 11/17/23 and reported LUE symptoms were improving, but still having times when arm felt heavy and tight. DMTs were discussed and he chose Kesimpta. He completed cholestyramine washout after d/c Aubagio, and level was not detectable on labs following washout.Patient initiated Kesimpta in early January 2024.\"  Patient presents today for neuro follow-up.  Patient asked to have appointment virtual due to work commitments.  Patient is here for neuro follow-up.  Patient with history of MS.  Patient last seen on June 28, 2024 by Melisa Kamara.  Since last visit, patient denies any recent hospitalizations.  No recent infections.  No recurrent infections.  No UTIs.  No change in bowel or bladder.  No change in speech or swallowing.  Patient notes " occasional blurriness of vision and feels that he needs to have evaluation for glasses.  No loss of vision or eye pain.  Patient is to make an upcoming appointment for his annual Optho visit with Dr. Perez.  Patient had updated MRI of the head done on 9/17/2024.  This was compared to November 4, 2023.  Moderate chronic demyelinating plaques stable.  No new or progressive signal abnormality.  No MRI evidence of acute demyelination.  No stroke hemorrhage or enhancement.  Patient had MRI C-spine on 9/17/2024 compared to 9/19/2023.  Moderate to advanced demyelinating plaques throughout the visualized spinal cord.  Previously C5 lesio  remains however plaque remains but no enhancement as the enhancement has resolved.  Patient with mild spondylotic changes which is also stable from prior evaluation.  Patient had blood work on 9/14/2024.  Labs with normal IgA of 207, IgG 991, IgM 65.  White count 5.56, ANC 3.24, ALC 1.76.  AST 13 ALT 14.  .  Studies reviewed in detail with patient at appointment.  Patient is overdue to see Dr. Perez and will be making a follow-up appointment.  No seizures or loss of consciousness.  No change in bowel or bladder.  Patient is happy with his IMD choice of Kesimpta  No recent IV steroids or MS flare.      The following portions of the patient's history were reviewed and updated as appropriate: current medications, past family history, past medical history, past social history, past surgical history, and problem list.  Reviewed imaging from mri head and cspine from sept 17th. With pt at [facility].       Review of Systems        Objective     There were no vitals taken for this visit.  Physical Exam  Constitutional:       General: He is not in acute distress.     Appearance: He is not ill-appearing.   Musculoskeletal:      Right lower leg: No edema.      Left lower leg: No edema.   Neurological:      Mental Status: He is alert.      Cranial Nerves: Cranial nerves 2-12 are intact.      Motor:  Motor function is intact. No weakness, tremor, atrophy, abnormal muscle tone, seizure activity or pronator drift.      Coordination: Coordination is intact.      Gait: Gait is intact.

## 2024-10-22 NOTE — TELEPHONE ENCOUNTER
LVMM regarding F/U appt in February with Melisa Osei in [facility] office. Gave central # for return call.  Mailed Lab orders to patient address

## 2024-11-06 DIAGNOSIS — G35 MULTIPLE SCLEROSIS (HCC): Primary | ICD-10-CM

## 2024-11-06 RX ORDER — OFATUMUMAB 20 MG/.4ML
INJECTION, SOLUTION SUBCUTANEOUS
Qty: 0.4 ML | Refills: 5 | Status: SHIPPED | OUTPATIENT
Start: 2024-11-06

## 2024-12-16 ENCOUNTER — NURSE TRIAGE (OUTPATIENT)
Dept: NEUROLOGY | Facility: CLINIC | Age: 34
End: 2024-12-16

## 2024-12-16 NOTE — TELEPHONE ENCOUNTER
Pt called has been having shoulder and arm pain for the past 3 weeks. It is a dull stabbing pain. It is more skeletal not a muscle pain.     He tried taking baclofen but it didn't help.    He is hoping that its not MS flare up. But he feels fine other wise

## 2024-12-17 NOTE — TELEPHONE ENCOUNTER
Let pt know left shoulder is not related to his MS.  Pt can have peripheral nerve pain from shoulder or neck.  Recommend seeing pcp and ortho

## 2024-12-17 NOTE — TELEPHONE ENCOUNTER
Outbound call placed to patient, no answer. Okay to LMOM per communication form.    Stated message below. Provided office telephone number to call back with any questions.

## 2024-12-17 NOTE — TELEPHONE ENCOUNTER
"Outbound call placed to patient     Left Shoulder Pain  Patient reported increased pain in left shoulder that started about 2.5 weeks ago. Stated it's a constant dull/nagging/sometimes stabbing pain that radiates down in pt's left wrist and radiates into pt's neck. Pt denies any trauma to left arm/shoulder.     Current Medications/Interventions for shoulder:  --Advil   --Baclofen x4  in past 2.5 weeks  --Icey hot cream  --Ice and/or heat  Reported that the medications only provide minimal relief.     Stated that the pain feels more like nerves rather than muscular. Reported that pt constantly feels the burning/tingling sensation on lateral side of left arm and the constant throbbing/aching when sitting/sleeping/or moving left arm the wrong way. Pt reported tpain is reduces and is bearable when pt is \"raising\" his hand.   Pt denied any swelling, redness, tightness, open areas, tenderness to touch, bruising, etc to left shoulder/arm. Pt denies any signs of possible infection/cellulitis to left arm/shoulder.     Advised pt to contact his PCP as well to discuss current left shoulder pain.    MS Symptoms  Pt reported onset of burning/tingling sensation to lateral side of left shoulder/arm that started x2 days ago. Stated that it does not feel like pot's typical MS Flare up since pt did not have any cognitive issues before the flare up.     Current MS Medications:  --Kesimpta 20 mg/0.4ml: pt reported doing well on the medication. Stated  that he feels better on this med than prior meds.     Pt advised to contact PCP to discuss current left shoulder pain to r/o any infection/cellulitis or possible pinched nerve.     LOV: 10/21/24; pt to have repeat labs in 3 mos    MRI C-Spine: 9/19/24  IMPRESSION:  1. Moderate to advanced demyelinating plaque throughout the visualized spinal cord redemonstrated. Previously noted enhancing lesion at C5 remains although the enhancement has resolved. No new enhancement or new signal " "abnormality to indicate active or   progressive demyelination.  2. Mild spondylosis remains stable.    Dr. Owen: please review and advise        Reason for Disposition   Nursing judgment    Answer Assessment - Initial Assessment Questions  1. ONSET: \"When did the pain start?\"      Right before thanksgiving    2. LOCATION: \"Where is the pain located?\"      Left shoulder    3. PAIN: \"How bad is the pain?\" (Scale 1-10; or mild, moderate, severe)      Consistent dull/stabbing pain. But when moves shoulder a certain way, the pain stops arm from moving. Pain radiates down into wrist     4. WORK OR EXERCISE: \"Has there been any recent work or exercise that involved this part of the body?\"          5. CAUSE: \"What do you think is causing the shoulder pain?\"      Unsure    6. OTHER SYMPTOMS: \"Do you have any other symptoms?\" (e.g., neck pain, swelling, rash, fever, numbness, weakness)      denies    Answer Assessment - Initial Assessment Questions  New numbness/tingling?Yes   Burning/tingling on outside left arm     New Weakness/muscle spasms? No    Bowel/Bladder Changes? No    UTI Sx? (burning, itching, painful urination, retention, urgency etc.) No    Vision changes? (blurred/double vision, painful vision, decreased vision) No    Recent illness (cough, cold, chills, fever, sinus infection, URI, UTI, etc.)? No     Taking any disease modifying medication (copaxone, tecifdera, tysabri etc.) Yes, Kesimpta      Missed doses? No     New or worsening fatigue? No    Date/Type of Last MRI (brain/Tspine/Cspine)?   9/17/24: MRI C-Spine  9/17/24: MRI Brain MS  9/19/23: MRI C-Spine   History of IV steroids?Yes  X2 rounds of 3 sessions each  October/November 2023  Tolerated well    Diabetes or psych Hx No    Increase in stress? (new/lost job, family death, divorce etc.)   Yes, changed jobs in October but is less stressful job. Better type of stress.     Any drug use (including prescribed medical marijuana, cannabis, and/or CBD, " illicit street drugs etc)   No    Protocols used: Shoulder Pain-Adult-[place], No Protocol Available-Adult-[place]

## 2025-03-29 ENCOUNTER — APPOINTMENT (OUTPATIENT)
Dept: LAB | Facility: MEDICAL CENTER | Age: 35
End: 2025-03-29
Payer: COMMERCIAL

## 2025-03-29 DIAGNOSIS — G35 MULTIPLE SCLEROSIS (HCC): ICD-10-CM

## 2025-03-29 DIAGNOSIS — Z13.6 SCREENING FOR CARDIOVASCULAR CONDITION: ICD-10-CM

## 2025-03-29 LAB
ALBUMIN SERPL BCG-MCNC: 4.8 G/DL (ref 3.5–5)
ALP SERPL-CCNC: 63 U/L (ref 34–104)
ALT SERPL W P-5'-P-CCNC: 18 U/L (ref 7–52)
ANION GAP SERPL CALCULATED.3IONS-SCNC: 9 MMOL/L (ref 4–13)
AST SERPL W P-5'-P-CCNC: 17 U/L (ref 13–39)
BASOPHILS # BLD AUTO: 0.02 THOUSANDS/ÂΜL (ref 0–0.1)
BASOPHILS NFR BLD AUTO: 0 % (ref 0–1)
BILIRUB DIRECT SERPL-MCNC: 0.13 MG/DL (ref 0–0.2)
BILIRUB SERPL-MCNC: 0.78 MG/DL (ref 0.2–1)
BUN SERPL-MCNC: 24 MG/DL (ref 5–25)
CALCIUM SERPL-MCNC: 9.7 MG/DL (ref 8.4–10.2)
CHLORIDE SERPL-SCNC: 104 MMOL/L (ref 96–108)
CHOLEST SERPL-MCNC: 166 MG/DL (ref ?–200)
CO2 SERPL-SCNC: 29 MMOL/L (ref 21–32)
CREAT SERPL-MCNC: 0.79 MG/DL (ref 0.6–1.3)
EOSINOPHIL # BLD AUTO: 0.08 THOUSAND/ÂΜL (ref 0–0.61)
EOSINOPHIL NFR BLD AUTO: 2 % (ref 0–6)
ERYTHROCYTE [DISTWIDTH] IN BLOOD BY AUTOMATED COUNT: 12.4 % (ref 11.6–15.1)
GFR SERPL CREATININE-BSD FRML MDRD: 117 ML/MIN/1.73SQ M
GLUCOSE P FAST SERPL-MCNC: 94 MG/DL (ref 65–99)
HCT VFR BLD AUTO: 46.2 % (ref 36.5–49.3)
HDLC SERPL-MCNC: 51 MG/DL
HGB BLD-MCNC: 15.9 G/DL (ref 12–17)
IGA SERPL-MCNC: 202 MG/DL (ref 66–433)
IGG SERPL-MCNC: 1029 MG/DL (ref 635–1741)
IGM SERPL-MCNC: 63 MG/DL (ref 45–281)
IMM GRANULOCYTES # BLD AUTO: 0.02 THOUSAND/UL (ref 0–0.2)
IMM GRANULOCYTES NFR BLD AUTO: 0 % (ref 0–2)
LDLC SERPL CALC-MCNC: 96 MG/DL (ref 0–100)
LYMPHOCYTES # BLD AUTO: 1.69 THOUSANDS/ÂΜL (ref 0.6–4.47)
LYMPHOCYTES NFR BLD AUTO: 32 % (ref 14–44)
MCH RBC QN AUTO: 31.3 PG (ref 26.8–34.3)
MCHC RBC AUTO-ENTMCNC: 34.4 G/DL (ref 31.4–37.4)
MCV RBC AUTO: 91 FL (ref 82–98)
MONOCYTES # BLD AUTO: 0.39 THOUSAND/ÂΜL (ref 0.17–1.22)
MONOCYTES NFR BLD AUTO: 7 % (ref 4–12)
NEUTROPHILS # BLD AUTO: 3.17 THOUSANDS/ÂΜL (ref 1.85–7.62)
NEUTS SEG NFR BLD AUTO: 59 % (ref 43–75)
NONHDLC SERPL-MCNC: 115 MG/DL
NRBC BLD AUTO-RTO: 0 /100 WBCS
PLATELET # BLD AUTO: 247 THOUSANDS/UL (ref 149–390)
PMV BLD AUTO: 9.9 FL (ref 8.9–12.7)
POTASSIUM SERPL-SCNC: 4.7 MMOL/L (ref 3.5–5.3)
PROT SERPL-MCNC: 7.2 G/DL (ref 6.4–8.4)
RBC # BLD AUTO: 5.08 MILLION/UL (ref 3.88–5.62)
SODIUM SERPL-SCNC: 142 MMOL/L (ref 135–147)
TRIGL SERPL-MCNC: 95 MG/DL (ref ?–150)
WBC # BLD AUTO: 5.37 THOUSAND/UL (ref 4.31–10.16)

## 2025-03-29 PROCEDURE — 36415 COLL VENOUS BLD VENIPUNCTURE: CPT

## 2025-03-29 PROCEDURE — 85025 COMPLETE CBC W/AUTO DIFF WBC: CPT

## 2025-03-29 PROCEDURE — 80061 LIPID PANEL: CPT

## 2025-03-29 PROCEDURE — 82248 BILIRUBIN DIRECT: CPT

## 2025-03-29 PROCEDURE — 80053 COMPREHEN METABOLIC PANEL: CPT

## 2025-03-29 PROCEDURE — 82784 ASSAY IGA/IGD/IGG/IGM EACH: CPT

## 2025-03-30 ENCOUNTER — RESULTS FOLLOW-UP (OUTPATIENT)
Dept: NEUROLOGY | Facility: CLINIC | Age: 35
End: 2025-03-30

## 2025-03-30 DIAGNOSIS — G35 MULTIPLE SCLEROSIS (HCC): Primary | ICD-10-CM

## 2025-04-14 ENCOUNTER — HOSPITAL ENCOUNTER (OUTPATIENT)
Dept: RADIOLOGY | Age: 35
Discharge: HOME | End: 2025-04-14
Payer: COMMERCIAL

## 2025-04-14 DIAGNOSIS — G35 MULTIPLE SCLEROSIS (HCC): ICD-10-CM

## 2025-04-14 PROCEDURE — 70551 MRI BRAIN STEM W/O DYE: CPT

## 2025-05-07 ENCOUNTER — TELEPHONE (OUTPATIENT)
Dept: NEUROLOGY | Facility: CLINIC | Age: 35
End: 2025-05-07

## 2025-05-07 DIAGNOSIS — G35 MULTIPLE SCLEROSIS (HCC): ICD-10-CM

## 2025-05-07 RX ORDER — OFATUMUMAB 20 MG/.4ML
INJECTION, SOLUTION SUBCUTANEOUS
Qty: 0.4 ML | Refills: 4 | Status: SHIPPED | OUTPATIENT
Start: 2025-05-07

## 2025-05-07 RX ORDER — OFATUMUMAB 20 MG/.4ML
INJECTION, SOLUTION SUBCUTANEOUS
Qty: 0.4 ML | Refills: 5 | Status: CANCELLED | OUTPATIENT
Start: 2025-05-07

## 2025-05-07 NOTE — TELEPHONE ENCOUNTER
Per 10/21/24 LOV note:  Follow up in 4 months     Labs completed 3/29/25  MRI brain completed 4/14/25.     IMPRESSION:  Periventricular, subcortical, and pericallosal white matter demyelinating lesions similar to the prior 9/17/2024 examination. There is no new dominant white matter lesion.    Scheduling - please reach out to patient to schedule f/u appt per Dr. Owen's note. Thank you.     Script pended below.     Dr. Owen - please sign if agreeable. Thank you.